# Patient Record
Sex: FEMALE | Race: WHITE | NOT HISPANIC OR LATINO | URBAN - METROPOLITAN AREA
[De-identification: names, ages, dates, MRNs, and addresses within clinical notes are randomized per-mention and may not be internally consistent; named-entity substitution may affect disease eponyms.]

---

## 2023-04-06 ENCOUNTER — EMERGENCY (EMERGENCY)
Facility: HOSPITAL | Age: 78
LOS: 0 days | Discharge: HOME | End: 2023-04-06
Attending: STUDENT IN AN ORGANIZED HEALTH CARE EDUCATION/TRAINING PROGRAM
Payer: MEDICARE

## 2023-04-06 VITALS
RESPIRATION RATE: 14 BRPM | HEART RATE: 55 BPM | WEIGHT: 169.98 LBS | DIASTOLIC BLOOD PRESSURE: 61 MMHG | OXYGEN SATURATION: 98 % | TEMPERATURE: 97 F | SYSTOLIC BLOOD PRESSURE: 129 MMHG

## 2023-04-06 DIAGNOSIS — Z20.822 CONTACT WITH AND (SUSPECTED) EXPOSURE TO COVID-19: ICD-10-CM
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
## 2023-04-06 DIAGNOSIS — T31.0 BURNS INVOLVING LESS THAN 10% OF BODY SURFACE: ICD-10-CM

## 2023-04-06 DIAGNOSIS — T24.211A BURN OF SECOND DEGREE OF RIGHT THIGH, INITIAL ENCOUNTER: ICD-10-CM
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
## 2023-04-06 DIAGNOSIS — T21.22XA BURN OF SECOND DEGREE OF ABDOMINAL WALL, INITIAL ENCOUNTER: ICD-10-CM
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
## 2023-04-06 LAB — SARS-COV-2 RNA SPEC QL NAA+PROBE: SIGNIFICANT CHANGE UP

## 2023-04-06 PROCEDURE — U0005: CPT

## 2023-04-06 PROCEDURE — 99285 EMERGENCY DEPT VISIT HI MDM: CPT | Mod: 25

## 2023-04-06 PROCEDURE — U0003: CPT

## 2023-04-06 PROCEDURE — 99284 EMERGENCY DEPT VISIT MOD MDM: CPT | Mod: GC

## 2023-04-06 PROCEDURE — 16020 DRESS/DEBRID P-THICK BURN S: CPT

## 2023-04-06 RX ORDER — CEPHALEXIN 500 MG
1 CAPSULE ORAL
Qty: 28 | Refills: 0
Start: 2023-04-06 | End: 2023-04-12

## 2023-04-06 RX ORDER — CEPHALEXIN 500 MG
1 CAPSULE ORAL
Qty: 40 | Refills: 0
Start: 2023-04-06 | End: 2023-04-15

## 2023-04-06 RX ORDER — AMPICILLIN SODIUM AND SULBACTAM SODIUM 250; 125 MG/ML; MG/ML
3 INJECTION, POWDER, FOR SUSPENSION INTRAMUSCULAR; INTRAVENOUS ONCE
Refills: 0 | Status: COMPLETED | OUTPATIENT
Start: 2023-04-06 | End: 2023-04-06

## 2023-04-06 RX ORDER — SODIUM CHLORIDE 9 MG/ML
1000 INJECTION, SOLUTION INTRAVENOUS ONCE
Refills: 0 | Status: COMPLETED | OUTPATIENT
Start: 2023-04-06 | End: 2023-04-06

## 2023-04-06 NOTE — ED PROVIDER NOTE - PATIENT PORTAL LINK FT
You can access the FollowMyHealth Patient Portal offered by [facility] by registering at the following website: [email or web address]. By joining [employer]’s FollowMyHealth portal, you will also be able to view your health information using other applications (apps) compatible with our system.

## 2023-04-06 NOTE — ED PROVIDER NOTE - NSFOLLOWUPCLINICS_GEN_ALL_ED_FT
[facility] Lower Level  Burn  [address] [place] [address]  Phone: [phone]  Fax:   Scheduled Appointment: 4/11/2023

## 2023-04-06 NOTE — ED ADULT TRIAGE NOTE - CHIEF COMPLAINT QUOTE
pt sent in from [facility] [place] for a burn on lower abdomen and thighs. pt burned from hot soup on friday. hx of diabetic

## 2023-04-06 NOTE — CONSULT NOTE ADULT - SUBJECTIVE AND OBJECTIVE BOX
CC: Burn by hot soup    HPI: 79 y/o F with PMH CAD s/p PCI, HTN, HLD presented to ED with daughter at bedside for burn to lower abdomen and right thigh on 3/31/23. Pt states she was making cup noodle soup, had just poured boiling water into cup when cup turned over onto her lap. Since injury had been applying lotion to affected areas. Was seen at [facility] yesterday and prescribed Keflex PO and Silvadene; was instructed to follow up with a burn specialist. Pt denies fever, pus from burned areas.       ALLERGIES:  No Known Allergies      VITALS:  T(C): 36.3 (06 Apr 2023 14:38), Max: 36.3 (06 Apr 2023 14:38)  T(F): 97.3 (06 Apr 2023 14:38), Max: 97.3 (06 Apr 2023 14:38)  HR: 55 (06 Apr 2023 14:38) (55 - 55)  BP: 129/61 (06 Apr 2023 14:38) (129/61 - 129/61)  RR: 14 (06 Apr 2023 14:38) (14 - 14)  SpO2: 98% (06 Apr 2023 14:38) (98% - 98%)  O2 Parameters below as of 06 Apr 2023 14:38  Patient On (Oxygen Delivery Method): room air      PHYSICAL EXAM:  GENERAL: NAD, well-developed  HEAD:  Atraumatic, Normocephalic  EYES: EOMI  CHEST/LUNG: Breathing normally on room air.  ABDOMEN: Soft, Nontender, Nondistended  PSYCH: AAOx3  SKIN: Suprapubic abdomen with 2nd degree burn with central area of adherent epidermis, lifting at periphery, with surrounding erythema c/w superficial burn; no purulent drainage or warmth. Right proximal medial thigh with 2nd degree burn, no purulent drainage or warmth; thigh burn debrided. TBSA ~1%

## 2023-04-06 NOTE — ED PROVIDER NOTE - NSFOLLOWUPINSTRUCTIONS_ED_ALL_ED_FT
declines
Burn    A burn is an injury to your skin or the tissues under your skin usually caused by heat or caustic chemicals. In severe cases, a burn can damage the muscles and bones under the skin. There are three different degrees of burns: first (mild), second, and third (severe). Make sure to use any prescribed ointments as directed. If you were prescribed antibiotic medicine, take it as told by your health care provider. Do not stop using the antibiotic even if your condition improves. Follow up is available at the burn clinic.    SEEK IMMEDIATE MEDICAL CARE IF YOU HAVE ANY OF THE FOLLOWING SYMPTOMS: red streaks near the burn, severe pain, or fever.

## 2023-04-06 NOTE — ED PROVIDER NOTE - OBJECTIVE STATEMENT
78 y f, pmh of dm, pw burn, started on friday when she spilled hot soup on herself, on abx, sent in for eval. Denies f/c, cp, sob, n/v/d, abd pain dysuria . Denies pain/injury elsewhere.

## 2023-04-06 NOTE — CONSULT NOTE ADULT - ASSESSMENT
77 y/o F with burn by hot soup to lower abdomen and right thigh.    PLAN:  - S/p debridement of right thigh burn. Silvadene and adaptic applied to lower abdomen and thigh.  - Instructed on twice daily cleaning and dressing. Supplies provided.   - Send Silvadene to pharmacy  - Continue Keflex  - Follow up in burn clinic Tuesday 4/11

## 2023-04-06 NOTE — ED PROVIDER NOTE - CLINICAL SUMMARY MEDICAL DECISION MAKING FREE TEXT BOX
78-year-old female with history of diabetes, hypertension, hyperlipidemia, MI status post PCI, A-fib on Xarelto, CHF presenting today with second-degree burn to the mons pubis as well as the right inner thigh.  Sustained burn with hot soup on Friday, was prescribed Keflex by urgent care yesterday as well as Silvadene.  Presents today for burn evaluation.     On exam well-appearing in no acute distress, vital stable.  Noted to have approximately 5 cm area of second-degree burn to the mons pubis, approximately 3 cm area to the right inner thigh, debrided by burn bedside.  Minimal surrounding erythema.  Burn evaluated patient and cleared for outpatient follow-up, will resend Keflex prescription, every 6 hours as well as Bactrim as patient is high risk.  Return precautions ronak in detail with the patient as well as her daughter.  Follow-up with burn clinic on Tuesday.

## 2023-04-06 NOTE — ED PROVIDER NOTE - PHYSICAL EXAMINATION
CONSTITUTIONAL: NAD  SKIN: +superficial 2nd degree burn to lower abdomen and right leg, 7%   HEAD: NCAT  EYES: NL inspection  ENT: MMM  NECK: Supple; non tender.  CARD: RRR  RESP: CTAB  ABD: S/NT no R/G  EXT: no pedal edema  NEURO: Grossly unremarkable  PSYCH: Cooperative, appropriate.

## 2023-04-06 NOTE — ED ADULT NURSE NOTE - OBJECTIVE STATEMENT
77 y/o female sent in from [facility] [place] for a burn on lower abdomen and thighs. pt burned from hot soup on friday. hx of diabetic

## 2023-04-06 NOTE — ED ADULT NURSE NOTE - CHIEF COMPLAINT QUOTE
pt sent in from [facility] for a burn on lower abdomen and thighs. pt burned from hot soup on friday. hx of diabetic

## 2023-04-10 PROBLEM — Z00.00 ENCOUNTER FOR PREVENTIVE HEALTH EXAMINATION: Status: ACTIVE | Noted: 2023-04-10

## 2023-04-11 ENCOUNTER — APPOINTMENT (OUTPATIENT)
Dept: BURN CARE | Facility: CLINIC | Age: 78
End: 2023-04-11
Payer: MEDICARE

## 2023-04-11 VITALS
HEIGHT: 64 IN | SYSTOLIC BLOOD PRESSURE: 90 MMHG | WEIGHT: 171 LBS | TEMPERATURE: 97.4 F | HEART RATE: 62 BPM | RESPIRATION RATE: 16 BRPM | BODY MASS INDEX: 29.19 KG/M2 | DIASTOLIC BLOOD PRESSURE: 60 MMHG

## 2023-04-11 PROCEDURE — 99212 OFFICE O/P EST SF 10 MIN: CPT

## 2023-04-16 NOTE — REASON FOR VISIT
[Initial] : initial visit [Were you seen in the Emergency Room?] : seen in the emergency room [Were you admitted to the burn center at [facility]?] : not admitted to the burn center at [facility]

## 2023-04-16 NOTE — HISTORY OF PRESENT ILLNESS
[Did you have an operation on your burn/wound injury?] : Did you have an operation on your burn/wound injury? No [Did this injury occur on the job?] : Did this injury occur on the job? No [de-identified] : 3/31/23 [de-identified] : home [de-identified] : 2nd degree burn left thigh and abdomen [de-identified] : healing

## 2023-04-16 NOTE — ASSESSMENT
[FreeTextEntry1] : The 3% TBSA 2nd degree burn left thigh and abdomen are healing.  The burned skin is pink and moist. The patient was instructed to clean  the wound with soap and water. Continue local wound care with moisturizer and silvadene cream   Follow up 1 week.  [Wound Care] : wound care

## 2023-04-16 NOTE — PHYSICAL EXAM
[Healing] : healing [Size%: ______] : Size: [unfilled]% [Infected?] : Infected: No [3] : 3 out of 10 [Abnormal] : abnormal [Large] : medium [] : no [de-identified] : The 3% TBSA 2nd degree burn left thigh and abdomen are healing.  The burned skin is pink and moist. The patient was instructed to clean  the wound with soap and water. Continue local wound care with moisturizer and silvadene cream   Follow up 1 week.  [TWNoteComboBox1] : adaptic [TWNoteComboBox2] : SSD

## 2023-04-25 ENCOUNTER — APPOINTMENT (OUTPATIENT)
Dept: BURN CARE | Facility: CLINIC | Age: 78
End: 2023-04-25
Payer: MEDICARE

## 2023-04-25 VITALS
DIASTOLIC BLOOD PRESSURE: 80 MMHG | WEIGHT: 172 LBS | HEIGHT: 64 IN | TEMPERATURE: 97 F | BODY MASS INDEX: 29.37 KG/M2 | HEART RATE: 86 BPM | RESPIRATION RATE: 18 BRPM | SYSTOLIC BLOOD PRESSURE: 110 MMHG

## 2023-04-25 DIAGNOSIS — T30.0 BURN OF UNSPECIFIED BODY REGION, UNSPECIFIED DEGREE: ICD-10-CM

## 2023-04-25 PROCEDURE — 99213 OFFICE O/P EST LOW 20 MIN: CPT

## 2023-04-25 RX ORDER — SILVER SULFADIAZINE 10 MG/G
1 CREAM TOPICAL TWICE DAILY
Qty: 1 | Refills: 1 | Status: ACTIVE | COMMUNITY
Start: 2023-04-25 | End: 1900-01-01

## 2023-04-26 NOTE — HISTORY OF PRESENT ILLNESS
[Did you have an operation on your burn/wound injury?] : Did you have an operation on your burn/wound injury? No [Did this injury occur on the job?] : Did this injury occur on the job? No [de-identified] : 3/31/23 [de-identified] : home [de-identified] : 2nd degree burn left thigh and abdomen [de-identified] : healing

## 2023-04-26 NOTE — REASON FOR VISIT
[Revisit] : revisit [Were you seen in the Emergency Room?] : seen in the emergency room [Were you admitted to the burn center at [facility]?] : not admitted to the burn center at [facility]

## 2023-04-26 NOTE — PHYSICAL EXAM
[Healing] : healing [Size%: ______] : Size: [unfilled]% [Infected?] : Infected: No [3] : 3 out of 10 [Abnormal] : abnormal [Large] : medium [] : yes [de-identified] : The 3% TBSA 2nd degree burn left thigh and abdomen are healing.  The burned skin is pink and moist. The patient was instructed to clean  the wound with soap and water. Continue local wound care with moisturizer and silvadene cream   Follow up 1 week.  [TWNoteComboBox1] : adaptic [TWNoteComboBox2] : SSD

## 2023-04-27 NOTE — ED ADULT NURSE NOTE - CAS TRG GEN SKIN CONDITION
Warm Island Pedicle Flap-Requiring Vessel Identification Text: The defect edges were debeveled with a #15 scalpel blade. Given the location of the defect, shape of the defect and the proximity to free margins an island pedicle advancement flap was deemed most appropriate. Using a sterile surgical marker, an appropriate advancement flap was drawn, based on the axial vessel mentioned above, incorporating the defect, outlining the appropriate donor tissue and placing the expected incisions within the relaxed skin tension lines where possible. The area thus outlined was incised deep to adipose tissue with a #15 scalpel blade. The skin margins were undermined to an appropriate distance in all directions around the primary defect and laterally outward around the island pedicle utilizing iris scissors.  There was minimal undermining beneath the pedicle flap. Following this, the designed flap was carried over into the primary defect and sutured into place.

## 2023-05-09 ENCOUNTER — APPOINTMENT (OUTPATIENT)
Dept: BURN CARE | Facility: CLINIC | Age: 78
End: 2023-05-09
Payer: MEDICARE

## 2023-05-09 VITALS
SYSTOLIC BLOOD PRESSURE: 113 MMHG | BODY MASS INDEX: 30.65 KG/M2 | DIASTOLIC BLOOD PRESSURE: 63 MMHG | HEART RATE: 57 BPM | HEIGHT: 63 IN | WEIGHT: 173 LBS

## 2023-05-09 PROCEDURE — 99212 OFFICE O/P EST SF 10 MIN: CPT

## 2023-05-09 NOTE — ASSESSMENT
[FreeTextEntry1] : The 3% TBSA 2nd degree burn left thigh and 3rd degree burn  abdomen are healing.  The burned skin is pink and moist. The patient was instructed to clean  the wound with soap and water. Continue local wound care with moisturizer and silvadene cream   Follow up 2 week.  [Wound Care] : wound care

## 2023-05-09 NOTE — REASON FOR VISIT
[Revisit] : revisit [Were you seen in the Emergency Room?] : seen in the emergency room [Were you admitted to the burn center at [facility]?] : not admitted to the burn center at [facility]

## 2023-05-09 NOTE — HISTORY OF PRESENT ILLNESS
[Did you have an operation on your burn/wound injury?] : Did you have an operation on your burn/wound injury? No [Did this injury occur on the job?] : Did this injury occur on the job? No [de-identified] : 3/31/23 [de-identified] : home [de-identified] : 2nd degree burn and 3rd degree burn  left thigh and abdomen [de-identified] : healing

## 2023-05-09 NOTE — PHYSICAL EXAM
[Healing] : healing [Size%: ______] : Size: [unfilled]% [Infected?] : Infected: No [3] : 3 out of 10 [Abnormal] : abnormal [Large] : medium [] : yes [de-identified] : The 3% TBSA 2nd degree burn left thigh and 3rd degree burn  abdomen are healing.  The burned skin is pink and moist. The patient was instructed to clean  the wound with soap and water. Continue local wound care with moisturizer and silvadene cream   Follow up 2 week.  [TWNoteComboBox1] : adaptic [TWNoteComboBox2] : SSD

## 2023-05-23 ENCOUNTER — APPOINTMENT (OUTPATIENT)
Dept: BURN CARE | Facility: CLINIC | Age: 78
End: 2023-05-23
Payer: MEDICARE

## 2023-05-23 PROCEDURE — 99212 OFFICE O/P EST SF 10 MIN: CPT

## 2023-05-29 NOTE — ASSESSMENT
[FreeTextEntry1] : The 3% TBSA 2nd degree burn left thigh and 3rd degree burn  abdomen are healed.  The burned skin is pink and dry. The patient was instructed to clean  the wound with soap and water. Continue local wound care with moisturizer and sunblock.    Follow up prn. [Wound Care] : wound care

## 2023-05-29 NOTE — PHYSICAL EXAM
[Closed] : closed [Size%: ______] : Size: [unfilled]% [Infected?] : Infected: No [3] : 3 out of 10 [Abnormal] : abnormal [Large] : medium [] : no [de-identified] : The 3% TBSA 2nd degree burn left thigh and 3rd degree burn  abdomen are healed.  The burned skin is pink and dry. The patient was instructed to clean  the wound with soap and water. Continue local wound care with moisturizer and sunblock.    Follow up prn. [TWNoteComboBox1] : adaptic [TWNoteComboBox2] : False

## 2023-05-29 NOTE — HISTORY OF PRESENT ILLNESS
[Did you have an operation on your burn/wound injury?] : Did you have an operation on your burn/wound injury? No [Did this injury occur on the job?] : Did this injury occur on the job? No [de-identified] : 3/31/23 [de-identified] : home [de-identified] : 2nd degree burn and 3rd degree burn  left thigh and abdomen [de-identified] : healed

## 2023-05-29 NOTE — REASON FOR VISIT
[Revisit] : revisit [Were you seen in the Emergency Room?] : seen in the emergency room [Were you admitted to the burn center at [facility]?] : not admitted to the burn center at [facility]

## 2023-10-30 ENCOUNTER — EMERGENCY (EMERGENCY)
Facility: HOSPITAL | Age: 78
LOS: 1 days | Discharge: HOME | End: 2023-10-30
Attending: EMERGENCY MEDICINE
Payer: MEDICARE

## 2023-10-30 VITALS
TEMPERATURE: 98 F | OXYGEN SATURATION: 100 % | HEART RATE: 54 BPM | DIASTOLIC BLOOD PRESSURE: 73 MMHG | RESPIRATION RATE: 18 BRPM | SYSTOLIC BLOOD PRESSURE: 122 MMHG | WEIGHT: 177.91 LBS

## 2023-10-30 VITALS
DIASTOLIC BLOOD PRESSURE: 62 MMHG | OXYGEN SATURATION: 98 % | SYSTOLIC BLOOD PRESSURE: 138 MMHG | TEMPERATURE: 98 F | HEART RATE: 55 BPM | RESPIRATION RATE: 18 BRPM

## 2023-10-30 LAB
ALBUMIN SERPL ELPH-MCNC: 3.8 G/DL — SIGNIFICANT CHANGE UP (ref 3.3–5)
ALBUMIN SERPL ELPH-MCNC: 3.8 G/DL — SIGNIFICANT CHANGE UP (ref 3.3–5)
ALP SERPL-CCNC: 131 U/L — HIGH (ref 40–120)
ALP SERPL-CCNC: 131 U/L — HIGH (ref 40–120)
ALT FLD-CCNC: 25 U/L — SIGNIFICANT CHANGE UP (ref 10–45)
ALT FLD-CCNC: 25 U/L — SIGNIFICANT CHANGE UP (ref 10–45)
ANION GAP SERPL CALC-SCNC: 11 MMOL/L — SIGNIFICANT CHANGE UP (ref 5–17)
ANION GAP SERPL CALC-SCNC: 11 MMOL/L — SIGNIFICANT CHANGE UP (ref 5–17)
AST SERPL-CCNC: 25 U/L — SIGNIFICANT CHANGE UP (ref 10–40)
AST SERPL-CCNC: 25 U/L — SIGNIFICANT CHANGE UP (ref 10–40)
BASE EXCESS BLDV CALC-SCNC: -2.1 MMOL/L — LOW (ref -2–3)
BASE EXCESS BLDV CALC-SCNC: -2.1 MMOL/L — LOW (ref -2–3)
BASOPHILS # BLD AUTO: 0.05 K/UL — SIGNIFICANT CHANGE UP (ref 0–0.2)
BASOPHILS # BLD AUTO: 0.05 K/UL — SIGNIFICANT CHANGE UP (ref 0–0.2)
BASOPHILS NFR BLD AUTO: 0.9 % — SIGNIFICANT CHANGE UP (ref 0–2)
BASOPHILS NFR BLD AUTO: 0.9 % — SIGNIFICANT CHANGE UP (ref 0–2)
BILIRUB SERPL-MCNC: 0.4 MG/DL — SIGNIFICANT CHANGE UP (ref 0.2–1.2)
BILIRUB SERPL-MCNC: 0.4 MG/DL — SIGNIFICANT CHANGE UP (ref 0.2–1.2)
BUN SERPL-MCNC: 34 MG/DL — HIGH (ref 7–23)
BUN SERPL-MCNC: 34 MG/DL — HIGH (ref 7–23)
CA-I SERPL-SCNC: 1.22 MMOL/L — SIGNIFICANT CHANGE UP (ref 1.15–1.33)
CA-I SERPL-SCNC: 1.22 MMOL/L — SIGNIFICANT CHANGE UP (ref 1.15–1.33)
CALCIUM SERPL-MCNC: 8.6 MG/DL — SIGNIFICANT CHANGE UP (ref 8.4–10.5)
CALCIUM SERPL-MCNC: 8.6 MG/DL — SIGNIFICANT CHANGE UP (ref 8.4–10.5)
CHLORIDE BLDV-SCNC: 106 MMOL/L — SIGNIFICANT CHANGE UP (ref 96–108)
CHLORIDE BLDV-SCNC: 106 MMOL/L — SIGNIFICANT CHANGE UP (ref 96–108)
CHLORIDE SERPL-SCNC: 106 MMOL/L — SIGNIFICANT CHANGE UP (ref 96–108)
CHLORIDE SERPL-SCNC: 106 MMOL/L — SIGNIFICANT CHANGE UP (ref 96–108)
CO2 BLDV-SCNC: 26 MMOL/L — SIGNIFICANT CHANGE UP (ref 22–26)
CO2 BLDV-SCNC: 26 MMOL/L — SIGNIFICANT CHANGE UP (ref 22–26)
CO2 SERPL-SCNC: 23 MMOL/L — SIGNIFICANT CHANGE UP (ref 22–31)
CO2 SERPL-SCNC: 23 MMOL/L — SIGNIFICANT CHANGE UP (ref 22–31)
CREAT SERPL-MCNC: 1.53 MG/DL — HIGH (ref 0.5–1.3)
CREAT SERPL-MCNC: 1.53 MG/DL — HIGH (ref 0.5–1.3)
EGFR: 34 ML/MIN/1.73M2 — LOW
EGFR: 34 ML/MIN/1.73M2 — LOW
EOSINOPHIL # BLD AUTO: 0.34 K/UL — SIGNIFICANT CHANGE UP (ref 0–0.5)
EOSINOPHIL # BLD AUTO: 0.34 K/UL — SIGNIFICANT CHANGE UP (ref 0–0.5)
EOSINOPHIL NFR BLD AUTO: 5.8 % — SIGNIFICANT CHANGE UP (ref 0–6)
EOSINOPHIL NFR BLD AUTO: 5.8 % — SIGNIFICANT CHANGE UP (ref 0–6)
GAS PNL BLDV: 136 MMOL/L — SIGNIFICANT CHANGE UP (ref 136–145)
GAS PNL BLDV: 136 MMOL/L — SIGNIFICANT CHANGE UP (ref 136–145)
GAS PNL BLDV: SIGNIFICANT CHANGE UP
GLUCOSE BLDV-MCNC: 103 MG/DL — HIGH (ref 70–99)
GLUCOSE BLDV-MCNC: 103 MG/DL — HIGH (ref 70–99)
GLUCOSE SERPL-MCNC: 111 MG/DL — HIGH (ref 70–99)
GLUCOSE SERPL-MCNC: 111 MG/DL — HIGH (ref 70–99)
HCO3 BLDV-SCNC: 25 MMOL/L — SIGNIFICANT CHANGE UP (ref 22–29)
HCO3 BLDV-SCNC: 25 MMOL/L — SIGNIFICANT CHANGE UP (ref 22–29)
HCT VFR BLD CALC: 32.1 % — LOW (ref 34.5–45)
HCT VFR BLD CALC: 32.1 % — LOW (ref 34.5–45)
HCT VFR BLDA CALC: 30 % — LOW (ref 34.5–46.5)
HCT VFR BLDA CALC: 30 % — LOW (ref 34.5–46.5)
HGB BLD CALC-MCNC: 10.1 G/DL — LOW (ref 11.7–16.1)
HGB BLD CALC-MCNC: 10.1 G/DL — LOW (ref 11.7–16.1)
HGB BLD-MCNC: 10 G/DL — LOW (ref 11.5–15.5)
HGB BLD-MCNC: 10 G/DL — LOW (ref 11.5–15.5)
IMM GRANULOCYTES NFR BLD AUTO: 0.2 % — SIGNIFICANT CHANGE UP (ref 0–0.9)
IMM GRANULOCYTES NFR BLD AUTO: 0.2 % — SIGNIFICANT CHANGE UP (ref 0–0.9)
LACTATE BLDV-MCNC: 1.9 MMOL/L — SIGNIFICANT CHANGE UP (ref 0.5–2)
LACTATE BLDV-MCNC: 1.9 MMOL/L — SIGNIFICANT CHANGE UP (ref 0.5–2)
LYMPHOCYTES # BLD AUTO: 1.62 K/UL — SIGNIFICANT CHANGE UP (ref 1–3.3)
LYMPHOCYTES # BLD AUTO: 1.62 K/UL — SIGNIFICANT CHANGE UP (ref 1–3.3)
LYMPHOCYTES # BLD AUTO: 27.6 % — SIGNIFICANT CHANGE UP (ref 13–44)
LYMPHOCYTES # BLD AUTO: 27.6 % — SIGNIFICANT CHANGE UP (ref 13–44)
MAGNESIUM SERPL-MCNC: 2.5 MG/DL — SIGNIFICANT CHANGE UP (ref 1.6–2.6)
MAGNESIUM SERPL-MCNC: 2.5 MG/DL — SIGNIFICANT CHANGE UP (ref 1.6–2.6)
MCHC RBC-ENTMCNC: 29.5 PG — SIGNIFICANT CHANGE UP (ref 27–34)
MCHC RBC-ENTMCNC: 29.5 PG — SIGNIFICANT CHANGE UP (ref 27–34)
MCHC RBC-ENTMCNC: 31.2 GM/DL — LOW (ref 32–36)
MCHC RBC-ENTMCNC: 31.2 GM/DL — LOW (ref 32–36)
MCV RBC AUTO: 94.7 FL — SIGNIFICANT CHANGE UP (ref 80–100)
MCV RBC AUTO: 94.7 FL — SIGNIFICANT CHANGE UP (ref 80–100)
MONOCYTES # BLD AUTO: 0.73 K/UL — SIGNIFICANT CHANGE UP (ref 0–0.9)
MONOCYTES # BLD AUTO: 0.73 K/UL — SIGNIFICANT CHANGE UP (ref 0–0.9)
MONOCYTES NFR BLD AUTO: 12.4 % — SIGNIFICANT CHANGE UP (ref 2–14)
MONOCYTES NFR BLD AUTO: 12.4 % — SIGNIFICANT CHANGE UP (ref 2–14)
NEUTROPHILS # BLD AUTO: 3.12 K/UL — SIGNIFICANT CHANGE UP (ref 1.8–7.4)
NEUTROPHILS # BLD AUTO: 3.12 K/UL — SIGNIFICANT CHANGE UP (ref 1.8–7.4)
NEUTROPHILS NFR BLD AUTO: 53.1 % — SIGNIFICANT CHANGE UP (ref 43–77)
NEUTROPHILS NFR BLD AUTO: 53.1 % — SIGNIFICANT CHANGE UP (ref 43–77)
NRBC # BLD: 0 /100 WBCS — SIGNIFICANT CHANGE UP (ref 0–0)
NRBC # BLD: 0 /100 WBCS — SIGNIFICANT CHANGE UP (ref 0–0)
PCO2 BLDV: 50 MMHG — HIGH (ref 39–42)
PCO2 BLDV: 50 MMHG — HIGH (ref 39–42)
PH BLDV: 7.3 — LOW (ref 7.32–7.43)
PH BLDV: 7.3 — LOW (ref 7.32–7.43)
PLATELET # BLD AUTO: 175 K/UL — SIGNIFICANT CHANGE UP (ref 150–400)
PLATELET # BLD AUTO: 175 K/UL — SIGNIFICANT CHANGE UP (ref 150–400)
PO2 BLDV: 30 MMHG — SIGNIFICANT CHANGE UP (ref 25–45)
PO2 BLDV: 30 MMHG — SIGNIFICANT CHANGE UP (ref 25–45)
POTASSIUM BLDV-SCNC: 5.4 MMOL/L — HIGH (ref 3.5–5.1)
POTASSIUM BLDV-SCNC: 5.4 MMOL/L — HIGH (ref 3.5–5.1)
POTASSIUM SERPL-MCNC: 5.6 MMOL/L — HIGH (ref 3.5–5.3)
POTASSIUM SERPL-MCNC: 5.6 MMOL/L — HIGH (ref 3.5–5.3)
POTASSIUM SERPL-SCNC: 5.6 MMOL/L — HIGH (ref 3.5–5.3)
POTASSIUM SERPL-SCNC: 5.6 MMOL/L — HIGH (ref 3.5–5.3)
PROT SERPL-MCNC: 6.9 G/DL — SIGNIFICANT CHANGE UP (ref 6–8.3)
PROT SERPL-MCNC: 6.9 G/DL — SIGNIFICANT CHANGE UP (ref 6–8.3)
RBC # BLD: 3.39 M/UL — LOW (ref 3.8–5.2)
RBC # BLD: 3.39 M/UL — LOW (ref 3.8–5.2)
RBC # FLD: 14.5 % — SIGNIFICANT CHANGE UP (ref 10.3–14.5)
RBC # FLD: 14.5 % — SIGNIFICANT CHANGE UP (ref 10.3–14.5)
SAO2 % BLDV: 42.6 % — LOW (ref 67–88)
SAO2 % BLDV: 42.6 % — LOW (ref 67–88)
SODIUM SERPL-SCNC: 140 MMOL/L — SIGNIFICANT CHANGE UP (ref 135–145)
SODIUM SERPL-SCNC: 140 MMOL/L — SIGNIFICANT CHANGE UP (ref 135–145)
TROPONIN T, HIGH SENSITIVITY RESULT: 23 NG/L — SIGNIFICANT CHANGE UP (ref 0–51)
TROPONIN T, HIGH SENSITIVITY RESULT: 23 NG/L — SIGNIFICANT CHANGE UP (ref 0–51)
TROPONIN T, HIGH SENSITIVITY RESULT: 24 NG/L — SIGNIFICANT CHANGE UP (ref 0–51)
TROPONIN T, HIGH SENSITIVITY RESULT: 24 NG/L — SIGNIFICANT CHANGE UP (ref 0–51)
WBC # BLD: 5.87 K/UL — SIGNIFICANT CHANGE UP (ref 3.8–10.5)
WBC # BLD: 5.87 K/UL — SIGNIFICANT CHANGE UP (ref 3.8–10.5)
WBC # FLD AUTO: 5.87 K/UL — SIGNIFICANT CHANGE UP (ref 3.8–10.5)
WBC # FLD AUTO: 5.87 K/UL — SIGNIFICANT CHANGE UP (ref 3.8–10.5)

## 2023-10-30 PROCEDURE — 71045 X-RAY EXAM CHEST 1 VIEW: CPT | Mod: 26

## 2023-10-30 PROCEDURE — 99284 EMERGENCY DEPT VISIT MOD MDM: CPT

## 2023-10-30 PROCEDURE — 83605 ASSAY OF LACTIC ACID: CPT

## 2023-10-30 PROCEDURE — 82803 BLOOD GASES ANY COMBINATION: CPT

## 2023-10-30 PROCEDURE — 82550 ASSAY OF CK (CPK): CPT

## 2023-10-30 PROCEDURE — 80053 COMPREHEN METABOLIC PANEL: CPT

## 2023-10-30 PROCEDURE — 82947 ASSAY GLUCOSE BLOOD QUANT: CPT

## 2023-10-30 PROCEDURE — 85018 HEMOGLOBIN: CPT

## 2023-10-30 PROCEDURE — 82330 ASSAY OF CALCIUM: CPT

## 2023-10-30 PROCEDURE — 36415 COLL VENOUS BLD VENIPUNCTURE: CPT

## 2023-10-30 PROCEDURE — 99285 EMERGENCY DEPT VISIT HI MDM: CPT | Mod: 25

## 2023-10-30 PROCEDURE — 83735 ASSAY OF MAGNESIUM: CPT

## 2023-10-30 PROCEDURE — 84484 ASSAY OF TROPONIN QUANT: CPT

## 2023-10-30 PROCEDURE — 71045 X-RAY EXAM CHEST 1 VIEW: CPT

## 2023-10-30 PROCEDURE — 93005 ELECTROCARDIOGRAM TRACING: CPT

## 2023-10-30 PROCEDURE — 85025 COMPLETE CBC W/AUTO DIFF WBC: CPT

## 2023-10-30 PROCEDURE — 84132 ASSAY OF SERUM POTASSIUM: CPT

## 2023-10-30 PROCEDURE — 85014 HEMATOCRIT: CPT

## 2023-10-30 PROCEDURE — 84295 ASSAY OF SERUM SODIUM: CPT

## 2023-10-30 PROCEDURE — 82435 ASSAY OF BLOOD CHLORIDE: CPT

## 2023-10-30 RX ORDER — ACETAMINOPHEN 500 MG
1000 TABLET ORAL ONCE
Refills: 0 | Status: COMPLETED | OUTPATIENT
Start: 2023-10-30 | End: 2023-10-30

## 2023-10-30 RX ADMIN — Medication 400 MILLIGRAM(S): at 12:25

## 2023-10-30 RX ADMIN — Medication 1000 MILLIGRAM(S): at 12:40

## 2023-10-30 RX ADMIN — Medication 1000 MILLIGRAM(S): at 13:00

## 2023-10-30 NOTE — ED ADULT NURSE NOTE - NSFALLHARMRISKINTERV_ED_ALL_ED
Assistance OOB with selected safe patient handling equipment if applicable/Assistance with ambulation/Communicate risk of Fall with Harm to all staff, patient, and family/Monitor gait and stability/Provide visual cue: red socks, yellow wristband, yellow gown, etc/Reinforce activity limits and safety measures with patient and family/Bed in lowest position, wheels locked, appropriate side rails in place/Call bell, personal items and telephone in reach/Instruct patient to call for assistance before getting out of bed/chair/stretcher/Non-slip footwear applied when patient is off stretcher/Grace to call system/Physically safe environment - no spills, clutter or unnecessary equipment/Purposeful Proactive Rounding/Room/bathroom lighting operational, light cord in reach

## 2023-10-30 NOTE — ED PROVIDER NOTE - NSFOLLOWUPINSTRUCTIONS_ED_ALL_ED_FT
-- Please follow-up with your doctor(s) within the next week, but see medical sooner if your symptoms persist or worsen.  Please call tomorrow for an appointment.  If you cannot follow-up with your primary doctor please return to the ED for any urgent issues.  -- You were given a copy of your labs and imaging.  Please go-over these with your doctor(s).   -- If you have any worsening of symptoms or any other concerns please see your doctor or return to the ED immediately.    Chest Pain    Chest pain can be caused by many different conditions which may or may not be dangerous. Causes include heartburn, lung infections, heart attack, blood clot in lungs, skin infections, strain or damage to muscle, cartilage, or bones, etc. In addition to a history and physical examination, an electrocardiogram (ECG) or other lab tests may have been performed to determine the cause of your chest pain. Follow up with your primary care provider or with a cardiologist as instructed.     SEEK IMMEDIATE MEDICAL CARE IF YOU HAVE ANY OF THE FOLLOWING SYMPTOMS: worsening chest pain, coughing up blood, unexplained back/neck/jaw pain, severe abdominal pain, dizziness or lightheadedness, fainting, shortness of breath, sweaty or clammy skin, vomiting, or racing heart beat. These symptoms may represent a serious problem that is an emergency. Do not wait to see if the symptoms will go away. Get medical help right away. Call 911 and do not drive yourself to the hospital.

## 2023-10-30 NOTE — ED PROVIDER NOTE - MDM ORDERS SUBMITTED SELECTION
Pt feng. Stated she was leaving immediately to get her kids, left before signing AMA paper. IV removed.       Erika Reid RN  08/03/23 0078
Labs/EKG/Imaging Studies/Medications

## 2023-10-30 NOTE — ED PROVIDER NOTE - CLINICAL SUMMARY MEDICAL DECISION MAKING FREE TEXT BOX
Jaron Busby MD, PGY2  78-year-old female with past medical history of diabetes, CAD with 2 stents, atrial fibrillation on Xarelto presenting to emergency department with right-sided chest pain intermittent in nature for the past week.  Patient states pain is worse with movement.  This morning began feeling pain on left side of chest as well so presented to emergency department for evaluation.  Also states that during episode of chest pain feels mild lightheadedness that improves when sitting down.  Patient has not been taking any medication for symptom.  Follows with cardiologist Dr. Bustos at [facility], but states she needs a new cardiologist because he is too far away.  Pain is not associated with exertion.  Comes and goes at random times.  Denies fever, headache, cough, shortness of breath, abdominal pain, nausea, vomiting, diarrhea, extremity edema.    In the emergency department patient is hemodynamically stable, afebrile.  EKG showing sinus bradycardia with first-degree AV block, patient on metoprolol.  As per patient heart rate always in the 50s at baseline.  On exam patient has normal heart sounds, clear lungs, abdomen soft nontender nondistended.  Differential including but not limited to ACS, unstable/stable angina, MSK related pain.  Plan to obtain labs, chest x-ray, put on cardiac monitor, will give Tylenol to see if it improves symptoms.  Will reassess.

## 2023-10-30 NOTE — ED PROVIDER NOTE - PATIENT PORTAL LINK FT
You can access the FollowMyHealth Patient Portal offered by [facility] by registering at the following website: [email or web address]. By joining [employer]’s FollowMyHealth portal, you will also be able to view your health information using other applications (apps) compatible with our system.

## 2023-10-30 NOTE — ED PROVIDER NOTE - PROGRESS NOTE DETAILS
Jaron Busby MD, PGY2  Labs and imaging non actionable. likely msk related pain. Alvaro Jurado discuss cardiology follow up with patient in ED. Pt will follow up with Dr. Duenas. Plan to dc home. Pt in agreement with plan. Answered all questions and gave return precautions.

## 2023-10-30 NOTE — ED PROVIDER NOTE - PHYSICAL EXAMINATION
Gen: WDWN, NAD  HEENT: EOMI, no nasal discharge, mucous membranes moist  CV: bradycardic, +S1/S2, no M/R/G, 2+ radial pulses b/l. No chest wall tenderness to palpation  Resp: CTAB, no W/R/R, no accessory muscle use, no increased work of breathing  GI: Abdomen soft non-distended, NTTP  MSK: No open wounds, no bruising, no LE edema  Neuro: A&Ox4, following commands, moving all four extremities spontaneously  Psych: appropriate mood

## 2023-10-30 NOTE — ED PROVIDER NOTE - ATTENDING CONTRIBUTION TO CARE
Attending MD Guallpa: I personally have seen and examined this patient.  Resident note reviewed and agree on plan of care and except where noted.  See below for details.     Seen in [facility]    78F with PMH/PSH including DM, CAD s/p stents, AFib on Xarelto presents to the ED with chest pain for a week.  Reports chest pain is R sided, intermittent, worse with movement, with associated dizziness.  Reports pain improves with sitting and resting.  Denies exertional, shortness of breath, LOC, headache.  Denies abdominal pain, nausea, vomiting, diarrhea, black or bloody BMs.  Denies recent illness, URI symptoms, fevers.  Denies trauma, falls, MVC.    Exam:   General: NAD  HENT: head NCAT, airway patent  Eyes: anicteric, no conjunctival injection   Lungs: lungs CTAB with good inspiratory effort, no wheezing, no rhonchi, no rales  Cardiac: +S1S2, no obvious m/r/g, no LE edema  GI: abdomen soft with +BS, NT, ND  : no CVAT  MSK: ranging neck and extremities freely  Neuro: moving all extremities spontaneously, nonfocal  Psych: normal mood and affect     A/P: 78F with chest pain, will evaluate for but not limited to unstable angina/ACS, MSK, will consider infectious such as PNA, will obtain labs including trop, CXR, EKG, will keep on monitor, will give Tylenol, reassess

## 2023-10-30 NOTE — ED ADULT NURSE NOTE - OBJECTIVE STATEMENT
[id number] 78 yr old WF brought to ER by daughter for further eval and tx of Right breast pain. s/p tripped and fell 4 wks ago landing on chest and face per daughter. No LOC. Pt is on Xarelto. A&Ox4. Denies dizziness or HA. Healing ecchymosis noted at right breast. Fall risk precautions maintained. Denies dizziness, palp, SOB, cough, congestion, fever or chills. Fall risk precautions maintained. Cardiac monitor intact

## 2023-11-07 ENCOUNTER — APPOINTMENT (OUTPATIENT)
Dept: CARDIOLOGY | Facility: CLINIC | Age: 78
End: 2023-11-07
Payer: MEDICARE

## 2023-11-07 VITALS
SYSTOLIC BLOOD PRESSURE: 117 MMHG | DIASTOLIC BLOOD PRESSURE: 78 MMHG | WEIGHT: 182 LBS | OXYGEN SATURATION: 97 % | TEMPERATURE: 98.2 F | HEIGHT: 63 IN | BODY MASS INDEX: 32.25 KG/M2 | HEART RATE: 64 BPM

## 2023-11-07 DIAGNOSIS — Z82.49 FAMILY HISTORY OF ISCHEMIC HEART DISEASE AND OTHER DISEASES OF THE CIRCULATORY SYSTEM: ICD-10-CM

## 2023-11-07 PROBLEM — I25.10 ATHEROSCLEROTIC HEART DISEASE OF NATIVE CORONARY ARTERY WITHOUT ANGINA PECTORIS: Chronic | Status: ACTIVE | Noted: 2023-10-30

## 2023-11-07 PROBLEM — E11.9 TYPE 2 DIABETES MELLITUS WITHOUT COMPLICATIONS: Chronic | Status: ACTIVE | Noted: 2023-10-30

## 2023-11-07 PROCEDURE — 99204 OFFICE O/P NEW MOD 45 MIN: CPT

## 2023-11-07 PROCEDURE — 93000 ELECTROCARDIOGRAM COMPLETE: CPT

## 2023-11-07 RX ORDER — DAPAGLIFLOZIN 10 MG/1
10 TABLET, FILM COATED ORAL
Qty: 90 | Refills: 3 | Status: ACTIVE | COMMUNITY

## 2023-11-07 RX ORDER — SPIRONOLACTONE 25 MG/1
25 TABLET ORAL DAILY
Refills: 0 | Status: ACTIVE | COMMUNITY

## 2023-11-07 RX ORDER — METOPROLOL SUCCINATE 25 MG/1
25 TABLET, EXTENDED RELEASE ORAL
Qty: 270 | Refills: 2 | Status: ACTIVE | COMMUNITY

## 2023-11-07 RX ORDER — ROSUVASTATIN CALCIUM 20 MG/1
20 TABLET, FILM COATED ORAL
Qty: 90 | Refills: 0 | Status: ACTIVE | COMMUNITY

## 2023-11-07 RX ORDER — GABAPENTIN 300 MG/1
300 CAPSULE ORAL DAILY
Refills: 0 | Status: ACTIVE | COMMUNITY

## 2023-11-07 RX ORDER — CHOLECALCIFEROL (VITAMIN D3) 25 MCG
TABLET ORAL
Refills: 0 | Status: ACTIVE | COMMUNITY

## 2023-11-07 RX ORDER — MONTELUKAST 10 MG/1
10 TABLET, FILM COATED ORAL
Refills: 0 | Status: ACTIVE | COMMUNITY

## 2023-11-07 RX ORDER — LEVOTHYROXINE SODIUM 50 UG/1
50 CAPSULE ORAL
Refills: 0 | Status: ACTIVE | COMMUNITY

## 2023-11-07 RX ORDER — TIZANIDINE HYDROCHLORIDE 4 MG/1
4 CAPSULE ORAL
Refills: 0 | Status: ACTIVE | COMMUNITY

## 2023-11-15 ENCOUNTER — APPOINTMENT (OUTPATIENT)
Dept: CARDIOLOGY | Facility: CLINIC | Age: 78
End: 2023-11-15
Payer: MEDICARE

## 2023-11-15 PROCEDURE — 78452 HT MUSCLE IMAGE SPECT MULT: CPT

## 2023-11-15 PROCEDURE — A9500: CPT

## 2023-11-15 PROCEDURE — 93306 TTE W/DOPPLER COMPLETE: CPT

## 2023-11-15 PROCEDURE — 93015 CV STRESS TEST SUPVJ I&R: CPT

## 2023-11-15 RX ORDER — REGADENOSON 0.08 MG/ML
0.4 INJECTION, SOLUTION INTRAVENOUS
Qty: 1 | Refills: 0 | Status: COMPLETED | OUTPATIENT
Start: 2023-11-15

## 2023-11-15 RX ADMIN — REGADENOSON 0 MG/5ML: 0.08 INJECTION, SOLUTION INTRAVENOUS at 00:00

## 2023-11-19 PROCEDURE — 93241 XTRNL ECG REC>48HR<7D: CPT

## 2023-11-20 ENCOUNTER — APPOINTMENT (OUTPATIENT)
Dept: CARDIOLOGY | Facility: CLINIC | Age: 78
End: 2023-11-20
Payer: MEDICARE

## 2023-11-20 VITALS
BODY MASS INDEX: 31.89 KG/M2 | HEIGHT: 63 IN | SYSTOLIC BLOOD PRESSURE: 90 MMHG | OXYGEN SATURATION: 96 % | HEART RATE: 61 BPM | WEIGHT: 180 LBS | DIASTOLIC BLOOD PRESSURE: 60 MMHG | TEMPERATURE: 97.7 F

## 2023-11-20 DIAGNOSIS — M79.601 PAIN IN RIGHT ARM: ICD-10-CM

## 2023-11-20 PROCEDURE — 93000 ELECTROCARDIOGRAM COMPLETE: CPT

## 2023-11-20 PROCEDURE — 99214 OFFICE O/P EST MOD 30 MIN: CPT

## 2023-11-20 RX ORDER — ASPIRIN 81 MG/1
81 TABLET, FILM COATED ORAL DAILY
Refills: 0 | Status: ACTIVE | COMMUNITY
Start: 2023-11-20

## 2023-11-21 ENCOUNTER — NON-APPOINTMENT (OUTPATIENT)
Age: 78
End: 2023-11-21

## 2023-11-21 LAB
25(OH)D3 SERPL-MCNC: 27 NG/ML
ALBUMIN SERPL ELPH-MCNC: 4.1 G/DL
ALBUMIN SERPL ELPH-MCNC: 4.2 G/DL
ALP BLD-CCNC: 134 U/L
ALP BLD-CCNC: 148 U/L
ALT SERPL-CCNC: 20 U/L
ALT SERPL-CCNC: 23 U/L
ANION GAP SERPL CALC-SCNC: 13 MMOL/L
ANION GAP SERPL CALC-SCNC: 15 MMOL/L
APPEARANCE: CLEAR
AST SERPL-CCNC: 23 U/L
AST SERPL-CCNC: 27 U/L
BACTERIA: ABNORMAL /HPF
BASOPHILS # BLD AUTO: 0.05 K/UL
BASOPHILS NFR BLD AUTO: 0.7 %
BILIRUB DIRECT SERPL-MCNC: 0.1 MG/DL
BILIRUB DIRECT SERPL-MCNC: 0.1 MG/DL
BILIRUB INDIRECT SERPL-MCNC: 0.1 MG/DL
BILIRUB INDIRECT SERPL-MCNC: 0.2 MG/DL
BILIRUB SERPL-MCNC: 0.2 MG/DL
BILIRUB SERPL-MCNC: 0.4 MG/DL
BILIRUBIN URINE: NEGATIVE
BLOOD URINE: NEGATIVE
BUN SERPL-MCNC: 32 MG/DL
BUN SERPL-MCNC: 39 MG/DL
CALCIUM SERPL-MCNC: 9.1 MG/DL
CALCIUM SERPL-MCNC: 9.2 MG/DL
CAST: 1 /LPF
CHLORIDE SERPL-SCNC: 100 MMOL/L
CHLORIDE SERPL-SCNC: 107 MMOL/L
CHOLEST SERPL-MCNC: 139 MG/DL
CK SERPL-CCNC: 64 U/L
CO2 SERPL-SCNC: 20 MMOL/L
CO2 SERPL-SCNC: 22 MMOL/L
COLOR: YELLOW
CREAT SERPL-MCNC: 1.57 MG/DL
CREAT SERPL-MCNC: 1.69 MG/DL
EGFR: 30 ML/MIN/1.73M2
EGFR: 33 ML/MIN/1.73M2
EOSINOPHIL # BLD AUTO: 0.54 K/UL
EOSINOPHIL NFR BLD AUTO: 7.1 %
EPITHELIAL CELLS: 13 /HPF
ESTIMATED AVERAGE GLUCOSE: 157 MG/DL
FERRITIN SERPL-MCNC: 30 NG/ML
GGT SERPL-CCNC: 19 U/L
GLUCOSE QUALITATIVE U: >=1000 MG/DL
GLUCOSE SERPL-MCNC: 116 MG/DL
GLUCOSE SERPL-MCNC: 136 MG/DL
HBA1C MFR BLD HPLC: 7.1 %
HCT VFR BLD CALC: 35.4 %
HCT VFR BLD CALC: 36.1 %
HDLC SERPL-MCNC: 40 MG/DL
HGB BLD-MCNC: 10.9 G/DL
HGB BLD-MCNC: 11.1 G/DL
IMM GRANULOCYTES NFR BLD AUTO: 0.4 %
IRON SATN MFR SERPL: 11 %
IRON SERPL-MCNC: 43 UG/DL
KETONES URINE: NEGATIVE MG/DL
LDLC SERPL CALC-MCNC: 69 MG/DL
LEUKOCYTE ESTERASE URINE: ABNORMAL
LYMPHOCYTES # BLD AUTO: 2.03 K/UL
LYMPHOCYTES NFR BLD AUTO: 26.7 %
MAGNESIUM SERPL-MCNC: 2.6 MG/DL
MAN DIFF?: NORMAL
MCHC RBC-ENTMCNC: 30.1 PG
MCHC RBC-ENTMCNC: 30.5 PG
MCHC RBC-ENTMCNC: 30.7 GM/DL
MCHC RBC-ENTMCNC: 30.8 GM/DL
MCV RBC AUTO: 97.8 FL
MCV RBC AUTO: 99.2 FL
MICROSCOPIC-UA: NORMAL
MONOCYTES # BLD AUTO: 0.83 K/UL
MONOCYTES NFR BLD AUTO: 10.9 %
NEUTROPHILS # BLD AUTO: 4.11 K/UL
NEUTROPHILS NFR BLD AUTO: 54.2 %
NITRITE URINE: NEGATIVE
NONHDLC SERPL-MCNC: 99 MG/DL
NT-PROBNP SERPL-MCNC: 270 PG/ML
PH URINE: 6.5
PLATELET # BLD AUTO: 228 K/UL
PLATELET # BLD AUTO: 238 K/UL
POTASSIUM SERPL-SCNC: 4.9 MMOL/L
POTASSIUM SERPL-SCNC: 5.2 MMOL/L
PROT SERPL-MCNC: 7.1 G/DL
PROT SERPL-MCNC: 7.3 G/DL
PROTEIN URINE: NORMAL MG/DL
RBC # BLD: 3.57 M/UL
RBC # BLD: 3.69 M/UL
RBC # FLD: 14.8 %
RBC # FLD: 15.1 %
RED BLOOD CELLS URINE: 1 /HPF
SODIUM SERPL-SCNC: 135 MMOL/L
SODIUM SERPL-SCNC: 141 MMOL/L
SPECIFIC GRAVITY URINE: >1.03
T4 FREE SERPL-MCNC: 1.4 NG/DL
TIBC SERPL-MCNC: 392 UG/DL
TRIGL SERPL-MCNC: 178 MG/DL
TSH SERPL-ACNC: 2.36 UIU/ML
UIBC SERPL-MCNC: 349 UG/DL
UROBILINOGEN URINE: 0.2 MG/DL
WBC # FLD AUTO: 7.59 K/UL
WBC # FLD AUTO: 8.32 K/UL
WHITE BLOOD CELLS URINE: 5 /HPF

## 2023-11-27 ENCOUNTER — NON-APPOINTMENT (OUTPATIENT)
Age: 78
End: 2023-11-27

## 2024-01-10 ENCOUNTER — APPOINTMENT (OUTPATIENT)
Dept: PULMONOLOGY | Facility: CLINIC | Age: 79
End: 2024-01-10
Payer: MEDICARE

## 2024-01-10 VITALS — OXYGEN SATURATION: 95 % | DIASTOLIC BLOOD PRESSURE: 58 MMHG | SYSTOLIC BLOOD PRESSURE: 98 MMHG | HEART RATE: 64 BPM

## 2024-01-10 PROCEDURE — ZZZZZ: CPT

## 2024-01-10 PROCEDURE — 94727 GAS DIL/WSHOT DETER LNG VOL: CPT

## 2024-01-10 PROCEDURE — 95012 NITRIC OXIDE EXP GAS DETER: CPT

## 2024-01-10 PROCEDURE — 99204 OFFICE O/P NEW MOD 45 MIN: CPT | Mod: 25

## 2024-01-10 PROCEDURE — 94729 DIFFUSING CAPACITY: CPT

## 2024-01-10 PROCEDURE — 94060 EVALUATION OF WHEEZING: CPT

## 2024-01-10 RX ORDER — ERGOCALCIFEROL 1.25 MG/1
1.25 MG CAPSULE, LIQUID FILLED ORAL
Qty: 12 | Refills: 3 | Status: ACTIVE | COMMUNITY
Start: 2024-01-10

## 2024-01-11 NOTE — ASSESSMENT
[FreeTextEntry1] : Discussed with Christina at length regarding aforementioned chest x-ray, pulmonary function test and NIOX results in the office today.  She does not appear to have pulmonary toxicity from amiodarone usage at this point. May continue amiodarone for history of atrial fibrillation from a pulmonary perspective at this point. Advised Christina to return periodically for pulmonary follow-up and pulmonary function test, while on amiodarone. Also strongly advised her to closely follow with you clinically.

## 2024-01-11 NOTE — PROCEDURE
[FreeTextEntry1] : Pulmonary Function Test performed in my office today: Spirometry: Within normal limits with no improvement postbronchodilator; Lung Volume: Within normal limits; Diffusion: Moderate impairment. ___________  Exhaled Nitric Oxide             Final  No Documents Attached    	Test  	Result  	Flag	Reference	Goal	Last Verified  	Exhaled Nitric Oxide	14	 	 		REQUIRED  Ordered by: FRITZ HILL       Collected/Examined: 10Jan2024 05:40PM       Verification Required       Stage: Final       Performed at: In Office       Performed by: FRITZ HILL       Resulted: 10Jan2024 05:40PM       Last Updated: 10Jan2024 05:40PM ___________ Report date:10/30/2023 View Order (Report matches exam selected in Patient History pane)     ACC: [id number] EXAM: XR CHEST PORTABLE URGENT 1V ORDERED BY: GRACIE QUIROZ  PROCEDURE DATE: 10/30/2023    INTERPRETATION: CLINICAL INFORMATION: Chest Pain.  TECHNIQUE: Frontal view of the chest.  COMPARISON: None.  FINDINGS:  Heart: Heart size is normal. Pulmonary: Clear lungs. No pneumothorax or pleural effusion. Bones: No acute bony pathology.  IMPRESSION: Clear lungs.  --- End of Report ---     DERRELL FISHER MD; Resident Radiologist This document has been electronically signed. KIN DOSHI MD; Attending Radiologist This document has been electronically signed. Oct 30 2023 2:43PM

## 2024-01-11 NOTE — HISTORY OF PRESENT ILLNESS
[TextBox_4] : Christina is a pleasant 78-year-old female with history of CAD, s/p 2 cardiac stents, atrial fibrillation on Xarelto and amiodarone, a fib on Xarelto/Amiodarone, s/p CAC twice in the past, CHF, PUD, she came in for pulmonary evaluation today, only complains of mild cough with throat clearing, but no chest pain, shortness of breath, fever or chills.  She is a non-smoker.

## 2024-01-11 NOTE — REASON FOR VISIT
[Consultation] : a consultation [TextBox_44] : on Amiodarone for atrial fibrillation. [TextBox_13] : Dr. Claudio Duenas

## 2024-01-11 NOTE — CONSULT LETTER
[Dear  ___] : Dear  [unfilled], [Courtesy Letter:] : I had the pleasure of seeing your patient, [unfilled], in my office today. [Please see my note below.] : Please see my note below. [Consult Closing:] : Thank you very much for allowing me to participate in the care of this patient.  If you have any questions, please do not hesitate to contact me. [Sincerely,] : Sincerely, [FreeTextEntry3] : Dr. Keli Uribe

## 2024-01-29 ENCOUNTER — APPOINTMENT (OUTPATIENT)
Dept: CARDIOLOGY | Facility: CLINIC | Age: 79
End: 2024-01-29
Payer: MEDICARE

## 2024-01-29 VITALS
BODY MASS INDEX: 32.25 KG/M2 | DIASTOLIC BLOOD PRESSURE: 58 MMHG | OXYGEN SATURATION: 98 % | HEART RATE: 55 BPM | WEIGHT: 182 LBS | TEMPERATURE: 97.9 F | SYSTOLIC BLOOD PRESSURE: 102 MMHG | HEIGHT: 63 IN

## 2024-01-29 DIAGNOSIS — Z13.228 ENCOUNTER FOR SCREENING FOR OTHER METABOLIC DISORDERS: ICD-10-CM

## 2024-01-29 DIAGNOSIS — R42 DIZZINESS AND GIDDINESS: ICD-10-CM

## 2024-01-29 DIAGNOSIS — E55.9 VITAMIN D DEFICIENCY, UNSPECIFIED: ICD-10-CM

## 2024-01-29 DIAGNOSIS — N28.9 DISORDER OF KIDNEY AND URETER, UNSPECIFIED: ICD-10-CM

## 2024-01-29 DIAGNOSIS — E83.41 HYPERMAGNESEMIA: ICD-10-CM

## 2024-01-29 PROCEDURE — 93000 ELECTROCARDIOGRAM COMPLETE: CPT

## 2024-01-29 PROCEDURE — G2211 COMPLEX E/M VISIT ADD ON: CPT

## 2024-01-29 PROCEDURE — 99214 OFFICE O/P EST MOD 30 MIN: CPT

## 2024-01-29 RX ORDER — LOSARTAN POTASSIUM 25 MG/1
25 TABLET, FILM COATED ORAL
Refills: 0 | Status: ACTIVE | COMMUNITY

## 2024-01-29 NOTE — HISTORY OF PRESENT ILLNESS
[FreeTextEntry1] : This is an 77 y/o female with a pmhx of diabetes, CAD with 2 stents, atrial fibrillation on Xarelto, PUD, CHF coming in today for follow up. Patient feels well and has no acute concerns or complaints. She reports PCP reduced losartan to 25 mg po qd due to low BP.  Patient denies chest pain, dyspnea, palpitations, dizziness, syncope, changes in bowel/bladder habits or appetite.

## 2024-01-30 ENCOUNTER — TRANSCRIPTION ENCOUNTER (OUTPATIENT)
Age: 79
End: 2024-01-30

## 2024-01-31 PROBLEM — E83.41 HYPERMAGNESEMIA: Status: ACTIVE | Noted: 2024-01-31

## 2024-01-31 LAB
25(OH)D3 SERPL-MCNC: 31.4 NG/ML
ALBUMIN SERPL ELPH-MCNC: 3.7 G/DL
ALP BLD-CCNC: 78 U/L
ALT SERPL-CCNC: 23 U/L
ANION GAP SERPL CALC-SCNC: 61 MMOL/L
APPEARANCE: CLEAR
AST SERPL-CCNC: 25 U/L
BACTERIA: ABNORMAL /HPF
BASOPHILS # BLD AUTO: 0.06 K/UL
BASOPHILS NFR BLD AUTO: 0.9 %
BILIRUB DIRECT SERPL-MCNC: 0.1 MG/DL
BILIRUB INDIRECT SERPL-MCNC: 0.1 MG/DL
BILIRUB SERPL-MCNC: 0.2 MG/DL
BILIRUBIN URINE: NEGATIVE
BLOOD URINE: NEGATIVE
BUN SERPL-MCNC: 32 MG/DL
CALCIUM SERPL-MCNC: 7.2 MG/DL
CAST: 0 /LPF
CHLORIDE SERPL-SCNC: 88 MMOL/L
CO2 SERPL-SCNC: 13 MMOL/L
COLOR: YELLOW
CREAT SERPL-MCNC: 1.53 MG/DL
EGFR: 34 ML/MIN/1.73M2
EOSINOPHIL # BLD AUTO: 0.55 K/UL
EOSINOPHIL NFR BLD AUTO: 8.4 %
EPITHELIAL CELLS: 4 /HPF
FERRITIN SERPL-MCNC: 20 NG/ML
FOLATE SERPL-MCNC: 12.8 NG/ML
GGT SERPL-CCNC: 11 U/L
GLUCOSE QUALITATIVE U: >=1000 MG/DL
GLUCOSE SERPL-MCNC: 93 MG/DL
HCT VFR BLD CALC: 31.4 %
HGB BLD-MCNC: 9.7 G/DL
IMM GRANULOCYTES NFR BLD AUTO: 0.2 %
IRON SATN MFR SERPL: 12 %
IRON SERPL-MCNC: 47 UG/DL
KETONES URINE: NEGATIVE MG/DL
LEUKOCYTE ESTERASE URINE: ABNORMAL
LYMPHOCYTES # BLD AUTO: 1.86 K/UL
LYMPHOCYTES NFR BLD AUTO: 28.4 %
MAGNESIUM SERPL-MCNC: 2.8 MG/DL
MAN DIFF?: NORMAL
MCHC RBC-ENTMCNC: 29 PG
MCHC RBC-ENTMCNC: 30.9 GM/DL
MCV RBC AUTO: 93.7 FL
MICROSCOPIC-UA: NORMAL
MONOCYTES # BLD AUTO: 0.77 K/UL
MONOCYTES NFR BLD AUTO: 11.8 %
NEUTROPHILS # BLD AUTO: 3.29 K/UL
NEUTROPHILS NFR BLD AUTO: 50.3 %
NITRITE URINE: NEGATIVE
NT-PROBNP SERPL-MCNC: 161 PG/ML
PH URINE: 5.5
PLATELET # BLD AUTO: 196 K/UL
POTASSIUM SERPL-SCNC: 4.6 MMOL/L
PROT SERPL-MCNC: 6 G/DL
PROTEIN URINE: NEGATIVE MG/DL
RBC # BLD: 3.35 M/UL
RBC # FLD: 14.8 %
RED BLOOD CELLS URINE: 0 /HPF
SODIUM SERPL-SCNC: 162 MMOL/L
SPECIFIC GRAVITY URINE: 1.03
T4 FREE SERPL-MCNC: 1.4 NG/DL
TIBC SERPL-MCNC: 381 UG/DL
TSH SERPL-ACNC: 2.03 UIU/ML
UIBC SERPL-MCNC: 334 UG/DL
UROBILINOGEN URINE: 0.2 MG/DL
VIT B12 SERPL-MCNC: 598 PG/ML
WBC # FLD AUTO: 6.54 K/UL
WHITE BLOOD CELLS URINE: 58 /HPF

## 2024-01-31 RX ORDER — IRON POLYSACCHARIDE COMPLEX 150 MG
150 CAPSULE ORAL
Qty: 90 | Refills: 0 | Status: ACTIVE | COMMUNITY
Start: 2024-01-31 | End: 1900-01-01

## 2024-02-05 ENCOUNTER — LABORATORY RESULT (OUTPATIENT)
Age: 79
End: 2024-02-05

## 2024-02-07 RX ORDER — RIVAROXABAN 15 MG/1
15 TABLET, FILM COATED ORAL
Qty: 90 | Refills: 1 | Status: ACTIVE | COMMUNITY
Start: 1900-01-01 | End: 1900-01-01

## 2024-02-22 ENCOUNTER — APPOINTMENT (OUTPATIENT)
Dept: ELECTROPHYSIOLOGY | Facility: CLINIC | Age: 79
End: 2024-02-22
Payer: MEDICARE

## 2024-02-22 ENCOUNTER — NON-APPOINTMENT (OUTPATIENT)
Age: 79
End: 2024-02-22

## 2024-02-22 VITALS — SYSTOLIC BLOOD PRESSURE: 90 MMHG | OXYGEN SATURATION: 97 % | DIASTOLIC BLOOD PRESSURE: 63 MMHG | HEART RATE: 52 BPM

## 2024-02-22 VITALS — WEIGHT: 178 LBS | BODY MASS INDEX: 31.54 KG/M2 | HEIGHT: 63 IN

## 2024-02-22 DIAGNOSIS — Z79.899 OTHER LONG TERM (CURRENT) DRUG THERAPY: ICD-10-CM

## 2024-02-22 PROCEDURE — 93000 ELECTROCARDIOGRAM COMPLETE: CPT

## 2024-02-22 PROCEDURE — 99204 OFFICE O/P NEW MOD 45 MIN: CPT

## 2024-02-22 NOTE — DISCUSSION/SUMMARY
[FreeTextEntry1] : In summary, this is a 78 year old woman with  diabetes, CAD status post-acute MI in 2021 and PCI x 2, paroxysmal atrial fibrillation on Xarelto, and heart failure.   She is referred to me by Dr. Duenas given her long-term use of amiodarone.  He would like to decrease the dose of amiodarone.  I agree that she would probably benefit from a lower dose at this time and likely can come off long-term.  Her episode of VT was in the setting of an MI and does not require long-term antiarrhythmic use.  She also had 1 episode of atrial fibrillation and we can always see how she does off the medication long-term.  Regardless for now I will decrease her amiodarone to 100 mg daily.  She will return to the office in 3 months.  Mrs. Brewster appeared to understand the whole discussion and verbalized that all of her questions were answered to her satisfaction.  Thank you for allowing me to be involved in the care of this pleasant woman. Please feel free to contact me with any questions. [EKG obtained to assist in diagnosis and management of assessed problem(s)] : EKG obtained to assist in diagnosis and management of assessed problem(s)

## 2024-02-22 NOTE — HISTORY OF PRESENT ILLNESS
[FreeTextEntry1] : Referring Physician: Claudio Duenas MD  Dear Dr. Duenas:   Mrs. Brewster was seen in the [facility] today. For our records, please allow me to summarize the history and my findings.   This pleasant 78 year old woman has a cardiovascular history significant for diabetes, CAD status post-acute MI in 2021 and PCI x 2, paroxysmal atrial fibrillation on Xarelto, and heart failure.  Her past medical history significant for peptic ulcer disease.  She reports some myocardial infarction in 2021 which was complicated by an acute episode of VT during the MI.  She also had episodes of rapid atrial fibrillation at that time.  She was started on amiodarone and has been maintained since.  She reports no other episodes of atrial fibrillation since.  She was told at that time she did not require defibrillator as her VT occurred during her MI.  She reports no dizziness palpitations or syncope.   Mrs. Brewster denies any recent history of chest pain, shortness of breath, palpitations, dizziness, or syncope.

## 2024-02-22 NOTE — CARDIOLOGY SUMMARY
[de-identified] : 2/22/2024: Sinus bradycardia at 53 bpm, first-degree AV block [de-identified] : 11/15/2023: 1. Myocardial Perfusion: Abnormal. Moderate lateral and inferolateral MI. No ischemia noted. 2. The patient underwent stress testing using pharmacological IV regadenoson (bolus injection, 400mcg over 10 to 20 seconds followed by 5ml flush) protocol. _ The total procedure time was 4 minutes . The test was stopped due to completion of protocol and shortness of breath. 3. Chest pain prior to test: no chest pain. Chest pain during pharmocologic test: no chest pain. 4. The left ventricle is normal in function and normal in size. The left ventricular EF% during stress is 53 %. The stress end diastolic volume is 108 ml and systolic volume is 50 ml. 5. Hypokinesis of the lateral and inferolateral walls. [de-identified] : 11/15/2023: 1. Left ventricular cavity is normal. Left ventricular systolic function is normal with an ejection fraction of 57 % by Michaels's method of disks. Regional wall motion abnormalities present. 2. Multiple segmental abnormalities exist. See findings. 3. There is mild (grade 1) left ventricular diastolic dysfunction. 4. Borderline left ventricular hypertrophy. 5. There is increased LV mass and concentric hypertrophy. 6. The left atrium is severely dilated. 7. Lipomatous interatrial septal hypertrophy present. 8. Calcification of the aortic valve leaflets. 9. Trace aortic regurgitation. 10. Mild to moderate pulmonic regurgitation. 11. Moderate mitral valve leaflet calcification. 12. There is mild calcification of the mitral valve annulus. 13. Mild to moderate mitral valve stenosis, secondary to dystrophic mitral annular calcification. 14. Moderate mitral regurgitation. 15. Trace tricuspid regurgitation. Pulmonary artery systolic pressure could not be estimated. 16. No pericardial effusion seen.

## 2024-02-23 ENCOUNTER — NON-APPOINTMENT (OUTPATIENT)
Age: 79
End: 2024-02-23

## 2024-02-26 ENCOUNTER — APPOINTMENT (OUTPATIENT)
Dept: UROLOGY | Facility: CLINIC | Age: 79
End: 2024-02-26
Payer: MEDICARE

## 2024-02-26 VITALS
HEIGHT: 63 IN | OXYGEN SATURATION: 96 % | TEMPERATURE: 98 F | SYSTOLIC BLOOD PRESSURE: 107 MMHG | DIASTOLIC BLOOD PRESSURE: 67 MMHG | WEIGHT: 178 LBS | HEART RATE: 63 BPM | RESPIRATION RATE: 18 BRPM | BODY MASS INDEX: 31.54 KG/M2

## 2024-02-26 DIAGNOSIS — Z78.9 OTHER SPECIFIED HEALTH STATUS: ICD-10-CM

## 2024-02-26 DIAGNOSIS — Z63.4 DISAPPEARANCE AND DEATH OF FAMILY MEMBER: ICD-10-CM

## 2024-02-26 PROCEDURE — 99204 OFFICE O/P NEW MOD 45 MIN: CPT

## 2024-02-26 PROCEDURE — 51798 US URINE CAPACITY MEASURE: CPT

## 2024-02-26 SDOH — SOCIAL STABILITY - SOCIAL INSECURITY: DISSAPEARANCE AND DEATH OF FAMILY MEMBER: Z63.4

## 2024-02-26 NOTE — REASON FOR VISIT
[TextEntry] : 78-year-old female who presents for abnormal urinalysis  Patient reports debris in her urine but otherwise denies bothersome urinary symptoms.  She reports occasional UTIs but no issues with infections.  She denies any prior urologic history.  She denies issues with emptying or straining to empty.  She denies any gross hematuria.  She is a G3, P2 via vaginal delivery.  She denies any vaginal bulge.  She denies any pelvic surgeries.  She has constipation at baseline as well as black tinge stool which may be secondary to iron.  Her history is remarkable for an MI x 2 2-1/2 years ago.  She also has diabetes  PVR 0 cc  Urinalysis 2/5 with 44 WBCs and significant glucose Urine culture negative Urinalysis with 10 WBCs and significant glucose on 1/29  Creatinine 1.7 on 1/29

## 2024-02-26 NOTE — PHYSICAL EXAM
[Normal Appearance] : normal appearance [Well Groomed] : well groomed [General Appearance - In No Acute Distress] : no acute distress [Abdomen Soft] : soft [Abdomen Tenderness] : non-tender [Normal Station and Gait] : the gait and station were normal for the patient's age [] : no rash [No Focal Deficits] : no focal deficits [Oriented To Time, Place, And Person] : oriented to person, place, and time [Affect] : the affect was normal [Mood] : the mood was normal [de-identified] : +red ulceration under pannus

## 2024-02-26 NOTE — ASSESSMENT
[FreeTextEntry1] : 78-year-old female who presents for pyuria and glucosuria  PVR was reassuring and patient denies any urinary symptoms at this time.  Advised that I recommend a renal bladder ultrasound and possibly cystoscopy.  She thinks she had a recent renal bladder ultrasound with her nephrologist and will send us those records.  I will call her when I have them.  I also encouraged her to drink water as she reports significant debris in her urine.  Will call the patient with the results from the renal bladder ultrasound

## 2024-02-26 NOTE — REVIEW OF SYSTEMS
[Feeling Tired] : feeling tired [Eyesight Problems] : eyesight problems [Heart Rate Is Fast] : fast heart rate [Constipation] : constipation [Heartburn] : heartburn [Leakage of urine with straining, coughing, laughing] : leakage of urine with straining, coughing, laughing [Feelings Of Weakness] : feelings of weakness [Negative] : Heme/Lymph

## 2024-03-05 ENCOUNTER — APPOINTMENT (OUTPATIENT)
Dept: GASTROENTEROLOGY | Facility: CLINIC | Age: 79
End: 2024-03-05
Payer: MEDICARE

## 2024-03-05 VITALS
DIASTOLIC BLOOD PRESSURE: 78 MMHG | HEIGHT: 63 IN | OXYGEN SATURATION: 99 % | SYSTOLIC BLOOD PRESSURE: 124 MMHG | TEMPERATURE: 97 F | RESPIRATION RATE: 14 BRPM | WEIGHT: 179 LBS | BODY MASS INDEX: 31.71 KG/M2 | HEART RATE: 73 BPM

## 2024-03-05 DIAGNOSIS — K59.09 OTHER CONSTIPATION: ICD-10-CM

## 2024-03-05 DIAGNOSIS — Z12.11 ENCOUNTER FOR SCREENING FOR MALIGNANT NEOPLASM OF COLON: ICD-10-CM

## 2024-03-05 DIAGNOSIS — R89.9 UNSPECIFIED ABNORMAL FINDING IN SPECIMENS FROM OTHER ORGANS, SYSTEMS AND TISSUES: ICD-10-CM

## 2024-03-05 PROCEDURE — 99204 OFFICE O/P NEW MOD 45 MIN: CPT

## 2024-03-05 NOTE — ASSESSMENT
[FreeTextEntry1] : 77 yo female, hx of iron def anemia, ob neg. Hx of MI, CHF. Never had colonoscopy. Had MI approx 2 years ago and hospitalized at [facility]; had cardiac arrest at that admission.. Latest EF 57%. Has dilated LA.- hx of atrial fibrillation on afib. Hx of constipation, long term, uses suppositories every other day. Cr. 1.72, ob neg stool in January.  IMP: 1. stable anemia, likely due to CRI, given ob neg stool 2. Colon cancer screening/constipation 3. CAD  PLAN: 1. as d/w pt, given age, cardiac hx, would proceed to virtual colonography first to which she agreed 2. Trial of linzess 72 ug po qd 3. RTO 2months 4. followup cbc/cr

## 2024-03-05 NOTE — HISTORY OF PRESENT ILLNESS
[FreeTextEntry1] : 77 yo female, hx of iron def anemia, ob neg. Hx of MI, CHF. Never had colonoscopy. Had MI approx 2 years ago and hospitalized at [facility]; had cardiac arrest at that admission.. Latest EF 57%. Has dilated LA.- hx of atrial fibrillation on afib. Hx of constipation, long term, uses suppositories every other day. Cr. 1.72, ob neg stool in January.

## 2024-03-12 LAB
ALBUMIN SERPL ELPH-MCNC: 3.7 G/DL
ALP BLD-CCNC: 118 U/L
ALT SERPL-CCNC: 13 U/L
ANION GAP SERPL CALC-SCNC: 15 MMOL/L
APPEARANCE: CLEAR
APPEARANCE: CLEAR
AST SERPL-CCNC: 25 U/L
BACTERIA UR CULT: NORMAL
BACTERIA: ABNORMAL /HPF
BACTERIA: ABNORMAL /HPF
BASOPHILS # BLD AUTO: 0.08 K/UL
BASOPHILS # BLD AUTO: 0.09 K/UL
BASOPHILS # BLD AUTO: 0.1 K/UL
BASOPHILS NFR BLD AUTO: 1.2 %
BASOPHILS NFR BLD AUTO: 1.3 %
BASOPHILS NFR BLD AUTO: 1.6 %
BILIRUB SERPL-MCNC: 0.2 MG/DL
BILIRUBIN URINE: NEGATIVE
BILIRUBIN URINE: NEGATIVE
BLOOD URINE: NEGATIVE
BLOOD URINE: NEGATIVE
BUN SERPL-MCNC: 31 MG/DL
CALCIUM SERPL-MCNC: 8.4 MG/DL
CAST: 1 /LPF
CHLORIDE SERPL-SCNC: 106 MMOL/L
CO2 SERPL-SCNC: 18 MMOL/L
COLOR: YELLOW
COLOR: YELLOW
CREAT SERPL-MCNC: 1.72 MG/DL
EGFR: 30 ML/MIN/1.73M2
EOSINOPHIL # BLD AUTO: 0.46 K/UL
EOSINOPHIL # BLD AUTO: 0.53 K/UL
EOSINOPHIL # BLD AUTO: 0.65 K/UL
EOSINOPHIL NFR BLD AUTO: 10.9 %
EOSINOPHIL NFR BLD AUTO: 7.1 %
EOSINOPHIL NFR BLD AUTO: 7.3 %
EPITHELIAL CELLS: 5 /HPF
GLUCOSE QUALITATIVE U: >=1000 MG/DL
GLUCOSE QUALITATIVE U: >=1000 MG/DL
GLUCOSE SERPL-MCNC: 199 MG/DL
HCT VFR BLD CALC: 31.8 %
HCT VFR BLD CALC: 33.2 %
HCT VFR BLD CALC: 34 %
HEMOCCULT STL QL: NEGATIVE
HGB BLD-MCNC: 10.4 G/DL
HGB BLD-MCNC: 10.4 G/DL
HGB BLD-MCNC: 9.9 G/DL
HYALINE CASTS: NORMAL
IMM GRANULOCYTES NFR BLD AUTO: 0.2 %
IMM GRANULOCYTES NFR BLD AUTO: 0.2 %
IMM GRANULOCYTES NFR BLD AUTO: 0.3 %
KETONES URINE: NEGATIVE MG/DL
KETONES URINE: NEGATIVE MG/DL
LEUKOCYTE ESTERASE URINE: ABNORMAL
LEUKOCYTE ESTERASE URINE: ABNORMAL
LYMPHOCYTES # BLD AUTO: 1.68 K/UL
LYMPHOCYTES # BLD AUTO: 1.87 K/UL
LYMPHOCYTES # BLD AUTO: 1.93 K/UL
LYMPHOCYTES NFR BLD AUTO: 25.7 %
LYMPHOCYTES NFR BLD AUTO: 28.1 %
LYMPHOCYTES NFR BLD AUTO: 29.9 %
MAGNESIUM SERPL-MCNC: 2.4 MG/DL
MAN DIFF?: NORMAL
MCHC RBC-ENTMCNC: 27.7 PG
MCHC RBC-ENTMCNC: 28.6 PG
MCHC RBC-ENTMCNC: 29.5 PG
MCHC RBC-ENTMCNC: 30.6 GM/DL
MCHC RBC-ENTMCNC: 31.1 GM/DL
MCHC RBC-ENTMCNC: 31.3 GM/DL
MCV RBC AUTO: 90.7 FL
MCV RBC AUTO: 91.9 FL
MCV RBC AUTO: 94.1 FL
MICROSCOPIC-UA: NORMAL
MICROSCOPIC-UA: NORMAL
MONOCYTES # BLD AUTO: 0.8 K/UL
MONOCYTES # BLD AUTO: 0.83 K/UL
MONOCYTES # BLD AUTO: 0.83 K/UL
MONOCYTES NFR BLD AUTO: 11.1 %
MONOCYTES NFR BLD AUTO: 13.3 %
MONOCYTES NFR BLD AUTO: 13.4 %
NEUTROPHILS # BLD AUTO: 2.75 K/UL
NEUTROPHILS # BLD AUTO: 2.99 K/UL
NEUTROPHILS # BLD AUTO: 4.1 K/UL
NEUTROPHILS NFR BLD AUTO: 46.1 %
NEUTROPHILS NFR BLD AUTO: 47.7 %
NEUTROPHILS NFR BLD AUTO: 54.6 %
NITRITE URINE: NEGATIVE
NITRITE URINE: NEGATIVE
PH URINE: 6
PH URINE: 6
PLATELET # BLD AUTO: 217 K/UL
PLATELET # BLD AUTO: 226 K/UL
PLATELET # BLD AUTO: 237 K/UL
POTASSIUM SERPL-SCNC: 4.9 MMOL/L
PROT SERPL-MCNC: 5.8 G/DL
PROTEIN URINE: 30 MG/DL
PROTEIN URINE: NEGATIVE MG/DL
RBC # BLD: 3.46 M/UL
RBC # BLD: 3.53 M/UL
RBC # BLD: 3.75 M/UL
RBC # FLD: 14.9 %
RBC # FLD: 15.1 %
RBC # FLD: 15.2 %
RED BLOOD CELLS URINE: 0 /HPF
RED BLOOD CELLS URINE: 3 /HPF
SODIUM SERPL-SCNC: 138 MMOL/L
SPECIFIC GRAVITY URINE: 1.03
SPECIFIC GRAVITY URINE: >1.03
SQUAMOUS EPITHELIAL CELLS: PRESENT
UROBILINOGEN URINE: 0.2 MG/DL
UROBILINOGEN URINE: 0.2 MG/DL
WBC # FLD AUTO: 5.97 K/UL
WBC # FLD AUTO: 6.26 K/UL
WBC # FLD AUTO: 7.5 K/UL
WHITE BLOOD CELLS URINE: 10 /HPF
WHITE BLOOD CELLS URINE: 44 /HPF

## 2024-03-18 ENCOUNTER — RX CHANGE (OUTPATIENT)
Age: 79
End: 2024-03-18

## 2024-03-18 RX ORDER — AMIODARONE HYDROCHLORIDE 100 MG/1
100 TABLET ORAL DAILY
Qty: 30 | Refills: 11 | Status: DISCONTINUED | COMMUNITY
Start: 2024-03-18 | End: 2024-03-18

## 2024-03-27 ENCOUNTER — OUTPATIENT (OUTPATIENT)
Dept: OUTPATIENT SERVICES | Facility: HOSPITAL | Age: 79
LOS: 1 days | End: 2024-03-27
Payer: MEDICARE

## 2024-03-27 ENCOUNTER — APPOINTMENT (OUTPATIENT)
Dept: CT IMAGING | Facility: CLINIC | Age: 79
End: 2024-03-27
Payer: MEDICARE

## 2024-03-27 DIAGNOSIS — Z12.11 ENCOUNTER FOR SCREENING FOR MALIGNANT NEOPLASM OF COLON: ICD-10-CM

## 2024-03-27 PROCEDURE — 74263 CT COLONOGRAPHY SCREENING: CPT

## 2024-03-27 PROCEDURE — 74263 CT COLONOGRAPHY SCREENING: CPT | Mod: 26

## 2024-04-01 ENCOUNTER — RX RENEWAL (OUTPATIENT)
Age: 79
End: 2024-04-01

## 2024-04-01 ENCOUNTER — NON-APPOINTMENT (OUTPATIENT)
Age: 79
End: 2024-04-01

## 2024-04-01 RX ORDER — IRON POLYSACCHARIDE COMPLEX 150 MG
150 CAPSULE ORAL
Qty: 90 | Refills: 0 | Status: ACTIVE | COMMUNITY
Start: 2024-04-01 | End: 1900-01-01

## 2024-05-08 ENCOUNTER — APPOINTMENT (OUTPATIENT)
Dept: PULMONOLOGY | Facility: CLINIC | Age: 79
End: 2024-05-08

## 2024-05-08 RX ORDER — LINACLOTIDE 72 UG/1
72 CAPSULE, GELATIN COATED ORAL
Qty: 90 | Refills: 0 | Status: ACTIVE | COMMUNITY
Start: 2024-03-05 | End: 1900-01-01

## 2024-05-15 ENCOUNTER — APPOINTMENT (OUTPATIENT)
Dept: CT IMAGING | Facility: CLINIC | Age: 79
End: 2024-05-15
Payer: MEDICARE

## 2024-05-15 PROCEDURE — 74176 CT ABD & PELVIS W/O CONTRAST: CPT

## 2024-05-22 ENCOUNTER — APPOINTMENT (OUTPATIENT)
Dept: ELECTROPHYSIOLOGY | Facility: CLINIC | Age: 79
End: 2024-05-22

## 2024-05-24 ENCOUNTER — APPOINTMENT (OUTPATIENT)
Dept: CARDIOLOGY | Facility: CLINIC | Age: 79
End: 2024-05-24
Payer: MEDICARE

## 2024-05-24 VITALS
BODY MASS INDEX: 31.36 KG/M2 | SYSTOLIC BLOOD PRESSURE: 120 MMHG | WEIGHT: 177 LBS | HEIGHT: 63 IN | TEMPERATURE: 99.8 F | OXYGEN SATURATION: 94 % | HEART RATE: 62 BPM | DIASTOLIC BLOOD PRESSURE: 70 MMHG

## 2024-05-24 DIAGNOSIS — D64.9 ANEMIA, UNSPECIFIED: ICD-10-CM

## 2024-05-24 DIAGNOSIS — E03.9 HYPOTHYROIDISM, UNSPECIFIED: ICD-10-CM

## 2024-05-24 DIAGNOSIS — E11.9 TYPE 2 DIABETES MELLITUS W/OUT COMPLICATIONS: ICD-10-CM

## 2024-05-24 DIAGNOSIS — R07.81 PLEURODYNIA: ICD-10-CM

## 2024-05-24 PROCEDURE — 99214 OFFICE O/P EST MOD 30 MIN: CPT

## 2024-05-24 PROCEDURE — G2211 COMPLEX E/M VISIT ADD ON: CPT

## 2024-05-24 PROCEDURE — 93000 ELECTROCARDIOGRAM COMPLETE: CPT

## 2024-05-24 NOTE — HISTORY OF PRESENT ILLNESS
[FreeTextEntry1] : This is an 80 y/o female with a pmhx of diabetes, CAD with 2 stents, atrial fibrillation on Xarelto, PUD, CHF coming in today for routine follow up. PCP Dr. Mead. Did finish Phenazopyridine tx for urinary frequency and blood in urine. CT AP performed for hematuria. Pt. states w/ gross hematuria. Did fall and injury ribs. Did have low Hb, PCP increase iron supplementation. Patient denies chest pain, dyspnea, palpitations, dizziness, syncope, changes in bowel/bladder habits or appetite.

## 2024-05-28 ENCOUNTER — APPOINTMENT (OUTPATIENT)
Dept: UROLOGY | Facility: CLINIC | Age: 79
End: 2024-05-28
Payer: MEDICARE

## 2024-05-28 ENCOUNTER — NON-APPOINTMENT (OUTPATIENT)
Age: 79
End: 2024-05-28

## 2024-05-28 VITALS
HEIGHT: 63 IN | SYSTOLIC BLOOD PRESSURE: 105 MMHG | BODY MASS INDEX: 31.36 KG/M2 | DIASTOLIC BLOOD PRESSURE: 65 MMHG | OXYGEN SATURATION: 95 % | HEART RATE: 65 BPM | WEIGHT: 177 LBS | RESPIRATION RATE: 16 BRPM

## 2024-05-28 LAB
BASOPHILS # BLD AUTO: 0.07 K/UL
BASOPHILS NFR BLD AUTO: 0.8 %
EOSINOPHIL # BLD AUTO: 0.53 K/UL
EOSINOPHIL NFR BLD AUTO: 6.3 %
HCT VFR BLD CALC: 32.4 %
HGB BLD-MCNC: 9.6 G/DL
IMM GRANULOCYTES NFR BLD AUTO: 0.4 %
LYMPHOCYTES # BLD AUTO: 1.88 K/UL
LYMPHOCYTES NFR BLD AUTO: 22.3 %
MAN DIFF?: NORMAL
MCHC RBC-ENTMCNC: 27.3 PG
MCHC RBC-ENTMCNC: 29.6 GM/DL
MCV RBC AUTO: 92 FL
MONOCYTES # BLD AUTO: 0.87 K/UL
MONOCYTES NFR BLD AUTO: 10.3 %
NEUTROPHILS # BLD AUTO: 5.04 K/UL
NEUTROPHILS NFR BLD AUTO: 59.9 %
PLATELET # BLD AUTO: 275 K/UL
RBC # BLD: 3.52 M/UL
RBC # FLD: 18.4 %
WBC # FLD AUTO: 8.42 K/UL

## 2024-05-28 PROCEDURE — G2211 COMPLEX E/M VISIT ADD ON: CPT

## 2024-05-28 PROCEDURE — 99213 OFFICE O/P EST LOW 20 MIN: CPT

## 2024-05-28 RX ORDER — LEVOTHYROXINE SODIUM 0.03 MG/1
25 TABLET ORAL DAILY
Qty: 30 | Refills: 1 | Status: ACTIVE | COMMUNITY
Start: 2024-05-28 | End: 1900-01-01

## 2024-05-28 RX ORDER — ESTRADIOL 0.1 MG/G
0.1 CREAM VAGINAL
Qty: 1 | Refills: 3 | Status: ACTIVE | COMMUNITY
Start: 2024-05-28 | End: 1900-01-01

## 2024-05-28 NOTE — ASSESSMENT
[FreeTextEntry1] : 79-year-old female who presents for hematuria episodes  We will send her urine for urinalysis, urine culture, urine cytology.  She already had a CT abdomen pelvis advised that the neck step would be a cystoscopy.  We also discussed UTI prevention which includes 1.5 L of water, vaginal estrogen cream 3 nights per week, cranberry tablets, and probiotic  Will call the patient with the results of the urine studies Return to office for cystoscopy

## 2024-05-28 NOTE — REASON FOR VISIT
[TextEntry] : 78-year-old female who presents for abnormal urinalysis  Patient reports debris in her urine but otherwise denies bothersome urinary symptoms.  She reports occasional UTIs but no issues with infections.  She denies any prior urologic history.  She denies issues with emptying or straining to empty.  She denies any gross hematuria.  She is a G3, P2 via vaginal delivery.  She denies any vaginal bulge.  She denies any pelvic surgeries.  She has constipation at baseline as well as black tinge stool which may be secondary to iron.  Her history is remarkable for an MI x 2 2-1/2 years ago.  She also has diabetes  PVR 0 cc  Urinalysis 2/5 with 44 WBCs and significant glucose Urine culture negative Urinalysis with 10 WBCs and significant glucose on 1/29  Creatinine 1.7 on 1/29 Recommended a renal bladder ultrasound and cystoscopy.  Patient reported that she had an ultrasound with her nephrologist and intended to send us the results.  5/24 creatinine 1.5 A1c 6.7%  Today on 5/28 patient reports that she has been having hematuria with associated urinary frequency urgency and dysuria.  She was seen by her outside PCP who diagnosed her with an infection.  She was treated with 2 courses of antibiotics.  However she continues to have dysuria and possible hematuria.  She was sent for a CT abdomen pelvis which was unremarkable.  Because of her kidney function, she is unable to get IV contrast.

## 2024-05-30 LAB
APPEARANCE: ABNORMAL
BACTERIA UR CULT: NORMAL
BACTERIA: NEGATIVE /HPF
BILIRUBIN URINE: NEGATIVE
BLOOD URINE: ABNORMAL
CAST: 0 /LPF
COARSE GRANULAR CASTS: PRESENT
COLOR: YELLOW
EPITHELIAL CELLS: 2 /HPF
GLUCOSE QUALITATIVE U: >=1000 MG/DL
KETONES URINE: NEGATIVE MG/DL
LEUKOCYTE ESTERASE URINE: ABNORMAL
MICROSCOPIC-UA: NORMAL
NITRITE URINE: NEGATIVE
PH URINE: 5.5
PROTEIN URINE: 100 MG/DL
RED BLOOD CELLS URINE: 326 /HPF
REVIEW: NORMAL
SPECIFIC GRAVITY URINE: 1.02
URINE CYTOLOGY: NORMAL
UROBILINOGEN URINE: 0.2 MG/DL
WHITE BLOOD CELLS URINE: 884 /HPF
YEAST-LIKE CELLS: PRESENT

## 2024-05-31 ENCOUNTER — APPOINTMENT (OUTPATIENT)
Dept: PULMONOLOGY | Facility: CLINIC | Age: 79
End: 2024-05-31
Payer: MEDICARE

## 2024-05-31 VITALS
SYSTOLIC BLOOD PRESSURE: 112 MMHG | DIASTOLIC BLOOD PRESSURE: 76 MMHG | OXYGEN SATURATION: 95 % | HEART RATE: 63 BPM | RESPIRATION RATE: 16 BRPM

## 2024-05-31 DIAGNOSIS — R07.9 CHEST PAIN, UNSPECIFIED: ICD-10-CM

## 2024-05-31 PROCEDURE — ZZZZZ: CPT

## 2024-05-31 PROCEDURE — 94727 GAS DIL/WSHOT DETER LNG VOL: CPT

## 2024-05-31 PROCEDURE — 99214 OFFICE O/P EST MOD 30 MIN: CPT | Mod: 25

## 2024-05-31 PROCEDURE — 94010 BREATHING CAPACITY TEST: CPT

## 2024-05-31 PROCEDURE — 71046 X-RAY EXAM CHEST 2 VIEWS: CPT

## 2024-05-31 PROCEDURE — 94729 DIFFUSING CAPACITY: CPT

## 2024-05-31 RX ORDER — EMPAGLIFLOZIN 10 MG/1
10 TABLET, FILM COATED ORAL DAILY
Qty: 90 | Refills: 0 | Status: DISCONTINUED | COMMUNITY
Start: 2024-05-28 | End: 2024-05-31

## 2024-05-31 NOTE — PROCEDURE
[FreeTextEntry1] : Pulmonary Function Test performed in my office today: Spirometry: Within normal limits with no improvement postbronchodilator; Lung Volume: Within normal limits; Diffusion: Moderate impairment. ______  Xray Chest 2 Views PA/Lat             Final  No Documents Attached    	 Chest x-ray PA and lateral views performed in my office today showed clear lungs, no evidence of pneumothorax, rib fractures, infiltrates or pleural effusions.  Ordered by: FRITZ HILL       Collected/Examined: [id number] 06:25PM       Verification Required       Stage: Final       Performed at: In Office       Performed by: FRITZ HILL       Resulted: [id number] 06:25PM       Last Updated: [id number] 06:27PM

## 2024-05-31 NOTE — ASSESSMENT
[FreeTextEntry1] : Obtained and reviewed pulmonary function test and chest x-ray results with patient today.  Continue amiodarone for history of atrial fibrillation.   Return for pulmonary follow up in 3 months.

## 2024-05-31 NOTE — DISCUSSION/SUMMARY
[FreeTextEntry1] : Ms. PHILIP LYNCH is an 79 year old female, history of atrial fibrillation, CAD. Reduced lung function likely secondary to splinting from chest pain secondary to recent fall.

## 2024-05-31 NOTE — ADDENDUM
[FreeTextEntry1] : I, Nichol Be, acted solely as a scribe for Dr. Keli Uribe D.O., on this date 05/31/2024.   All medical record entries made by the Scribe were at my, Dr. Keli Uribe D.O., direction and personally dictated by me on 05/31/2024. I have reviewed the chart and agree that the record accurately reflects my personal performance of the history, physical exam, assessment and plan. I have also personally directed, reviewed, and agreed with the chart.

## 2024-05-31 NOTE — HISTORY OF PRESENT ILLNESS
[TextBox_4] : PHILIP LYNCH is a 79 year old female, with history of atrial fibrillation, CAD, who presents to the office for follow up evaluation. Patient reports that she is feeling well overall with no respiratory complaints. She denies of having any symptoms of chest pain, dyspnea, or a cough. Patient reports that she continues to take Amiodarone for atrial fibrillation. She states of suffering from a recent fall 3 weeks ago which she has recovered from although continues to experience rib pain.

## 2024-06-03 ENCOUNTER — APPOINTMENT (OUTPATIENT)
Dept: UROLOGY | Facility: CLINIC | Age: 79
End: 2024-06-03

## 2024-06-05 ENCOUNTER — NON-APPOINTMENT (OUTPATIENT)
Age: 79
End: 2024-06-05

## 2024-06-05 ENCOUNTER — APPOINTMENT (OUTPATIENT)
Dept: ELECTROPHYSIOLOGY | Facility: CLINIC | Age: 79
End: 2024-06-05
Payer: MEDICARE

## 2024-06-05 VITALS — DIASTOLIC BLOOD PRESSURE: 78 MMHG | SYSTOLIC BLOOD PRESSURE: 138 MMHG | OXYGEN SATURATION: 95 % | HEART RATE: 62 BPM

## 2024-06-05 DIAGNOSIS — I48.91 UNSPECIFIED ATRIAL FIBRILLATION: ICD-10-CM

## 2024-06-05 DIAGNOSIS — I25.10 ATHEROSCLEROTIC HEART DISEASE OF NATIVE CORONARY ARTERY W/OUT ANGINA PECTORIS: ICD-10-CM

## 2024-06-05 DIAGNOSIS — I10 ESSENTIAL (PRIMARY) HYPERTENSION: ICD-10-CM

## 2024-06-05 DIAGNOSIS — I50.9 HEART FAILURE, UNSPECIFIED: ICD-10-CM

## 2024-06-05 DIAGNOSIS — I47.29 OTHER VENTRICULAR TACHYCARDIA: ICD-10-CM

## 2024-06-05 PROCEDURE — 93000 ELECTROCARDIOGRAM COMPLETE: CPT

## 2024-06-05 PROCEDURE — 99214 OFFICE O/P EST MOD 30 MIN: CPT

## 2024-06-05 RX ORDER — AMIODARONE HYDROCHLORIDE 100 MG/1
100 TABLET ORAL
Qty: 90 | Refills: 4 | Status: DISCONTINUED | COMMUNITY
Start: 2024-03-18 | End: 2024-06-05

## 2024-06-06 PROBLEM — I10 HTN (HYPERTENSION): Status: ACTIVE | Noted: 2023-11-07

## 2024-06-06 PROBLEM — I25.10 CAD (CORONARY ARTERY DISEASE): Status: ACTIVE | Noted: 2023-11-07

## 2024-06-06 NOTE — CARDIOLOGY SUMMARY
[de-identified] : 6/5/24: NSR 60,  first-degree AV block 2/22/2024: Sinus bradycardia at 53 bpm, first-degree AV block [de-identified] : 11/15/2023: 1. Myocardial Perfusion: Abnormal. Moderate lateral and inferolateral MI. No ischemia noted. 2. The patient underwent stress testing using pharmacological IV regadenoson (bolus injection, 400mcg over 10 to 20 seconds followed by 5ml flush) protocol. _ The total procedure time was 4 minutes . The test was stopped due to completion of protocol and shortness of breath. 3. Chest pain prior to test: no chest pain. Chest pain during pharmocologic test: no chest pain. 4. The left ventricle is normal in function and normal in size. The left ventricular EF% during stress is 53 %. The stress end diastolic volume is 108 ml and systolic volume is 50 ml. 5. Hypokinesis of the lateral and inferolateral walls. [de-identified] : 11/15/2023: 1. Left ventricular cavity is normal. Left ventricular systolic function is normal with an ejection fraction of 57 % by Michaels's method of disks. Regional wall motion abnormalities present. 2. Multiple segmental abnormalities exist. See findings. 3. There is mild (grade 1) left ventricular diastolic dysfunction. 4. Borderline left ventricular hypertrophy. 5. There is increased LV mass and concentric hypertrophy. 6. The left atrium is severely dilated. 7. Lipomatous interatrial septal hypertrophy present. 8. Calcification of the aortic valve leaflets. 9. Trace aortic regurgitation. 10. Mild to moderate pulmonic regurgitation. 11. Moderate mitral valve leaflet calcification. 12. There is mild calcification of the mitral valve annulus. 13. Mild to moderate mitral valve stenosis, secondary to dystrophic mitral annular calcification. 14. Moderate mitral regurgitation. 15. Trace tricuspid regurgitation. Pulmonary artery systolic pressure could not be estimated. 16. No pericardial effusion seen.

## 2024-06-06 NOTE — DISCUSSION/SUMMARY
[EKG obtained to assist in diagnosis and management of assessed problem(s)] : EKG obtained to assist in diagnosis and management of assessed problem(s) [FreeTextEntry1] : In summary, this is a 79 year old woman with diabetes, CAD status post-acute MI in 2021 and PCI x 2, paroxysmal atrial fibrillation on Xarelto, and heart failure.   She was referred to me by Dr. Duenas given her long-term use of amiodarone.  Last visit, she decreased dose to 100 mg daily and has been feeling well with no palpitations.  I agree that she can come off Amiodarone at this time. Her prior episode of VT was in the setting of an MI and does not require long-term antiarrhythmic use.  She also had 1 episode of atrial fibrillation and we can always see how she does off the medication long-term.  She will discontinue amiodarone after upcoming cystoscopy. She will return to the office in 3 months.  Mrs. Brewster appeared to understand the whole discussion and verbalized that all of her questions were answered to her satisfaction.  Thank you for allowing me to be involved in the care of this pleasant woman. Please feel free to contact me with any questions.

## 2024-06-06 NOTE — HISTORY OF PRESENT ILLNESS
[FreeTextEntry1] : Referring Physician: Claudio Duenas MD  Dear Dr. Duenas:   Mrs. Brewster was seen in the [facility] today. For our records, please allow me to summarize the history and my findings.   This pleasant 79 year old woman has a cardiovascular history significant for diabetes, CAD status post-acute MI in 2021 and PCI x 2, paroxysmal atrial fibrillation on Xarelto, and heart failure.  Her past medical history significant for peptic ulcer disease.  She reports some myocardial infarction in 2021 which was complicated by an acute episode of VT during the MI.  She also had episodes of rapid atrial fibrillation at that time.  She was started on amiodarone and has been maintained since.  She reports no other episodes of atrial fibrillation since.  She was told at that time she did not require defibrillator as her VT occurred during her MI.  She reports no dizziness palpitations or syncope.  Her Amio was decreased during her last office visit.   Mrs. Brewster denies any recent history of chest pain, shortness of breath, palpitations, dizziness, or syncope.

## 2024-06-06 NOTE — PHYSICAL EXAM

## 2024-06-09 ENCOUNTER — LABORATORY RESULT (OUTPATIENT)
Age: 79
End: 2024-06-09

## 2024-06-09 LAB
ALBUMIN SERPL ELPH-MCNC: 3.8 G/DL
ALBUMIN SERPL ELPH-MCNC: 3.8 G/DL
ALP BLD-CCNC: 134 U/L
ALP BLD-CCNC: 139 U/L
ALT SERPL-CCNC: 12 U/L
ALT SERPL-CCNC: 13 U/L
ANION GAP SERPL CALC-SCNC: 13 MMOL/L
ANION GAP SERPL CALC-SCNC: 14 MMOL/L
AST SERPL-CCNC: 15 U/L
AST SERPL-CCNC: 16 U/L
BILIRUB DIRECT SERPL-MCNC: 0.1 MG/DL
BILIRUB INDIRECT SERPL-MCNC: 0.2 MG/DL
BILIRUB SERPL-MCNC: 0.3 MG/DL
BILIRUB SERPL-MCNC: 0.3 MG/DL
BUN SERPL-MCNC: 17 MG/DL
BUN SERPL-MCNC: 18 MG/DL
CALCIUM SERPL-MCNC: 8.9 MG/DL
CALCIUM SERPL-MCNC: 9.1 MG/DL
CHLORIDE SERPL-SCNC: 107 MMOL/L
CHLORIDE SERPL-SCNC: 107 MMOL/L
CHOLEST SERPL-MCNC: 136 MG/DL
CK SERPL-CCNC: 59 U/L
CO2 SERPL-SCNC: 18 MMOL/L
CO2 SERPL-SCNC: 19 MMOL/L
CREAT SERPL-MCNC: 1.49 MG/DL
CREAT SERPL-MCNC: 1.51 MG/DL
EGFR: 35 ML/MIN/1.73M2
EGFR: 35 ML/MIN/1.73M2
ESTIMATED AVERAGE GLUCOSE: 146 MG/DL
FERRITIN SERPL-MCNC: 48 NG/ML
FOLATE SERPL-MCNC: 10.5 NG/ML
GLUCOSE SERPL-MCNC: 139 MG/DL
GLUCOSE SERPL-MCNC: 141 MG/DL
HBA1C MFR BLD HPLC: 6.7 %
HDLC SERPL-MCNC: 43 MG/DL
IRON SATN MFR SERPL: 42 %
IRON SERPL-MCNC: 152 UG/DL
LDLC SERPL CALC-MCNC: 73 MG/DL
MAGNESIUM SERPL-MCNC: 2.5 MG/DL
NONHDLC SERPL-MCNC: 94 MG/DL
NT-PROBNP SERPL-MCNC: 1319 PG/ML
POTASSIUM SERPL-SCNC: 5.2 MMOL/L
POTASSIUM SERPL-SCNC: 5.3 MMOL/L
PROT SERPL-MCNC: 6.6 G/DL
PROT SERPL-MCNC: 6.7 G/DL
SODIUM SERPL-SCNC: 139 MMOL/L
SODIUM SERPL-SCNC: 140 MMOL/L
T4 FREE SERPL-MCNC: 1.8 NG/DL
TIBC SERPL-MCNC: 360 UG/DL
TRANSFERRIN SERPL-MCNC: 288 MG/DL
TRIGL SERPL-MCNC: 115 MG/DL
TSH SERPL-ACNC: 0.06 UIU/ML
UIBC SERPL-MCNC: 208 UG/DL
VIT B12 SERPL-MCNC: 856 PG/ML

## 2024-06-10 ENCOUNTER — APPOINTMENT (OUTPATIENT)
Dept: UROLOGY | Facility: CLINIC | Age: 79
End: 2024-06-10
Payer: MEDICARE

## 2024-06-10 VITALS
BODY MASS INDEX: 31.36 KG/M2 | OXYGEN SATURATION: 97 % | WEIGHT: 177 LBS | SYSTOLIC BLOOD PRESSURE: 128 MMHG | RESPIRATION RATE: 16 BRPM | DIASTOLIC BLOOD PRESSURE: 76 MMHG | HEIGHT: 63 IN | HEART RATE: 53 BPM

## 2024-06-10 DIAGNOSIS — R82.90 UNSPECIFIED ABNORMAL FINDINGS IN URINE: ICD-10-CM

## 2024-06-10 DIAGNOSIS — R31.9 HEMATURIA, UNSPECIFIED: ICD-10-CM

## 2024-06-10 DIAGNOSIS — N39.0 URINARY TRACT INFECTION, SITE NOT SPECIFIED: ICD-10-CM

## 2024-06-10 PROCEDURE — 52000 CYSTOURETHROSCOPY: CPT

## 2024-06-17 DIAGNOSIS — B37.49 OTHER UROGENITAL CANDIDIASIS: ICD-10-CM

## 2024-06-17 LAB
APPEARANCE: ABNORMAL
BACTERIA: ABNORMAL /HPF
BILIRUBIN URINE: NEGATIVE
BLOOD URINE: ABNORMAL
CAST: 0 /LPF
COLOR: YELLOW
EPITHELIAL CELLS: 3 /HPF
GLUCOSE QUALITATIVE U: >=1000 MG/DL
KETONES URINE: NEGATIVE MG/DL
LEUKOCYTE ESTERASE URINE: ABNORMAL
MICROSCOPIC-UA: NORMAL
NITRITE URINE: NEGATIVE
PH URINE: 6
PROTEIN URINE: 100 MG/DL
RED BLOOD CELLS URINE: 561 /HPF
REVIEW: NORMAL
SPECIFIC GRAVITY URINE: 1.03
UROBILINOGEN URINE: 1 MG/DL
WHITE BLOOD CELLS URINE: >998 /HPF
YEAST-LIKE CELLS: PRESENT

## 2024-06-17 RX ORDER — FLUCONAZOLE 200 MG/1
200 TABLET ORAL DAILY
Qty: 10 | Refills: 0 | Status: ACTIVE | COMMUNITY
Start: 2024-06-17 | End: 1900-01-01

## 2024-07-02 LAB
APPEARANCE: CLEAR
BACTERIA: NEGATIVE /HPF
BILIRUBIN URINE: NEGATIVE
BLOOD URINE: ABNORMAL
CAST: 0 /LPF
COLOR: YELLOW
EPITHELIAL CELLS: 4 /HPF
GLUCOSE QUALITATIVE U: >=1000 MG/DL
KETONES URINE: NEGATIVE MG/DL
LEUKOCYTE ESTERASE URINE: ABNORMAL
MICROSCOPIC-UA: NORMAL
NITRITE URINE: NEGATIVE
PH URINE: 6.5
PROTEIN URINE: 30 MG/DL
RED BLOOD CELLS URINE: 64 /HPF
SPECIFIC GRAVITY URINE: 1.03
UROBILINOGEN URINE: 0.2 MG/DL
WHITE BLOOD CELLS URINE: 38 /HPF

## 2024-08-09 ENCOUNTER — LABORATORY RESULT (OUTPATIENT)
Age: 79
End: 2024-08-09

## 2024-08-27 ENCOUNTER — APPOINTMENT (OUTPATIENT)
Dept: PULMONOLOGY | Facility: CLINIC | Age: 79
End: 2024-08-27

## 2024-08-30 ENCOUNTER — APPOINTMENT (OUTPATIENT)
Dept: GASTROENTEROLOGY | Facility: CLINIC | Age: 79
End: 2024-08-30
Payer: MEDICARE

## 2024-08-30 ENCOUNTER — APPOINTMENT (OUTPATIENT)
Dept: PULMONOLOGY | Facility: CLINIC | Age: 79
End: 2024-08-30
Payer: MEDICARE

## 2024-08-30 VITALS
WEIGHT: 177 LBS | BODY MASS INDEX: 31.36 KG/M2 | HEIGHT: 63 IN | DIASTOLIC BLOOD PRESSURE: 60 MMHG | SYSTOLIC BLOOD PRESSURE: 96 MMHG | OXYGEN SATURATION: 99 % | RESPIRATION RATE: 14 BRPM | HEART RATE: 67 BPM | TEMPERATURE: 98 F

## 2024-08-30 VITALS — DIASTOLIC BLOOD PRESSURE: 61 MMHG | OXYGEN SATURATION: 98 % | SYSTOLIC BLOOD PRESSURE: 93 MMHG | HEART RATE: 59 BPM

## 2024-08-30 DIAGNOSIS — K63.5 POLYP OF COLON: ICD-10-CM

## 2024-08-30 DIAGNOSIS — R07.9 CHEST PAIN, UNSPECIFIED: ICD-10-CM

## 2024-08-30 DIAGNOSIS — I48.91 UNSPECIFIED ATRIAL FIBRILLATION: ICD-10-CM

## 2024-08-30 DIAGNOSIS — R89.9 UNSPECIFIED ABNORMAL FINDING IN SPECIMENS FROM OTHER ORGANS, SYSTEMS AND TISSUES: ICD-10-CM

## 2024-08-30 DIAGNOSIS — K59.09 OTHER CONSTIPATION: ICD-10-CM

## 2024-08-30 DIAGNOSIS — I25.10 ATHEROSCLEROTIC HEART DISEASE OF NATIVE CORONARY ARTERY W/OUT ANGINA PECTORIS: ICD-10-CM

## 2024-08-30 PROCEDURE — 94729 DIFFUSING CAPACITY: CPT

## 2024-08-30 PROCEDURE — 99214 OFFICE O/P EST MOD 30 MIN: CPT

## 2024-08-30 PROCEDURE — 94010 BREATHING CAPACITY TEST: CPT

## 2024-08-30 PROCEDURE — 99214 OFFICE O/P EST MOD 30 MIN: CPT | Mod: 25

## 2024-08-30 PROCEDURE — 94727 GAS DIL/WSHOT DETER LNG VOL: CPT

## 2024-08-30 PROCEDURE — 95012 NITRIC OXIDE EXP GAS DETER: CPT

## 2024-08-30 PROCEDURE — ZZZZZ: CPT

## 2024-08-30 NOTE — REASON FOR VISIT
[Initial Evaluation] : an initial evaluation [Follow-up] : a follow-up of an existing diagnosis [FreeTextEntry1] : anemia, two dim polyps

## 2024-08-30 NOTE — ASSESSMENT
[FreeTextEntry1] : 78 yo female, hx of iron def anemia, ob neg. Hx of MI, CHF. Never had colonoscopy. Had MI approx 2 years ago and hospitalized at [facility]; had cardiac arrest at that admission.. Latest EF 57%. Has dilated LA.- hx of atrial fibrillation on afib. Hx of constipation, long term, uses suppositories every other day. Cr. 1.72, ob neg stool in January.  RTO 8/29/24- Colonography from april reviwed; two 6 mm polyps identifed, asc colon. No rectal bleeding. Hx of MI, afib. Last hb 9.6 May 2024. Had cbc with primary and dtr states was 11..  IMP: 1. improved anemia, likely due to CRI, given ob neg stool 2 constipation improved.  PLAN: 1. as d/w pt, given age, cardiac hx, would not address the two dim polyps as risk of sedation with colonoscopy is significant. Pt and dtr agree 2.  linzess 72 ug po qd 3. RTO 6 months 4. followup cbc/cr at next visit with Dr. Lopez

## 2024-08-30 NOTE — PHYSICAL EXAM
[Alert] : alert [Normal Voice/Communication] : normal voice/communication [Healthy Appearing] : healthy appearing [No Acute Distress] : no acute distress [Sclera] : the sclera and conjunctiva were normal [Normal Lips/Gums] : the lips and gums were normal [Hearing Threshold Finger Rub Not [place]] : hearing was normal [Oropharynx] : the oropharynx was normal [Normal Appearance] : the appearance of the neck was normal [No Neck Mass] : no neck mass was observed [No Respiratory Distress] : no respiratory distress [No Acc Muscle Use] : no accessory muscle use [Respiration, Rhythm And Depth] : normal respiratory rhythm and effort [Auscultation Breath Sounds / Voice Sounds] : lungs were clear to auscultation bilaterally [Heart Rate And Rhythm] : heart rate was normal and rhythm regular [Normal S1, S2] : normal S1 and S2 [Murmurs] : no murmurs [Bowel Sounds] : normal bowel sounds [Abdomen Tenderness] : non-tender [No Masses] : no abdominal mass palpated [Abdomen Soft] : soft [] : no hepatosplenomegaly [Oriented To Time, Place, And Person] : oriented to person, place, and time

## 2024-08-30 NOTE — HISTORY OF PRESENT ILLNESS
[FreeTextEntry1] : 78 yo female, hx of iron def anemia, ob neg. Hx of MI, CHF. Never had colonoscopy. Had MI approx 2 years ago and hospitalized at [facility]; had cardiac arrest at that admission.. Latest EF 57%. Has dilated LA.- hx of atrial fibrillation on afib. Hx of constipation, long term, uses suppositories every other day. Cr. 1.72, ob neg stool in January.  RTO 8/29/24- Colonography from april reviwed; two 6 mm polyps identifed, asc colon. No rectal bleeding. Hx of MI, afib. Last hb 9.6 May 2024. Had cbc with primary and dtr states was 11. Still on asa 81 mg and xarelto. Amidiorone d/c. Has good bm with Linzess [de-identified] : 04/2024 virtual colonoscopy - 2 6mm polyps

## 2024-08-31 NOTE — ASSESSMENT
[FreeTextEntry1] : Obtained and reviewed pulmonary function test and NIOX results with patient today.  Off amiodarone for history of atrial fibrillation.   Return for pulmonary follow up in 6 months.

## 2024-08-31 NOTE — HISTORY OF PRESENT ILLNESS
[TextBox_4] : PHILIP LYNCH is a 79 year old female, with history of atrial fibrillation, CAD, who presents to the office for follow up evaluation. Patient reports that she is feeling well overall with no respiratory complaints. She denies of having any symptoms of chest pain, dyspnea, or a cough. Patient reports that she is off amiodarone now.  No chest pain, shortness of breath, or cough.

## 2024-08-31 NOTE — PROCEDURE
[FreeTextEntry1] : Pulmonary Function Test performed in my office today: Spirometry: Within normal limits with no improvement postbronchodilator; Lung Volume: Within normal limits; Diffusion: Moderate impairment. ______  Exhaled Nitric Oxide             Final  No Documents Attached    	Test  	Result  	Flag	Reference	Goal	Last Verified  	Exhaled Nitric Oxide	16	 	 		REQUIRED  Ordered by: FRITZ HILL       Collected/Examined: [id number] 11:41AM       Verification Required       Stage: Final       Performed at: In Office       Performed by: FRITZ HILL       Resulted: [id number] 11:41AM       Last Updated: [id number] 11:41AM

## 2024-08-31 NOTE — PROCEDURE
[FreeTextEntry1] : Pulmonary Function Test performed in my office today: Spirometry: Within normal limits with no improvement postbronchodilator; Lung Volume: Within normal limits; Diffusion: Moderate impairment. ______  Exhaled Nitric Oxide             Final  No Documents Attached    	Test  	Result  	Flag	Reference	Goal	Last Verified  	Exhaled Nitric Oxide	16	 	 		REQUIRED  Ordered by: FRITZ HILL       Collected/Examined: [id number] 11:41AM       Verification Required       Stage: Final       Performed at: In Office       Performed by: FRITZ HILL       Resulted: [id number] 11:41AM       Last Updated: [id number] 11:41AM

## 2024-09-06 ENCOUNTER — APPOINTMENT (OUTPATIENT)
Dept: UROLOGY | Facility: CLINIC | Age: 79
End: 2024-09-06
Payer: MEDICARE

## 2024-09-06 ENCOUNTER — NON-APPOINTMENT (OUTPATIENT)
Age: 79
End: 2024-09-06

## 2024-09-06 DIAGNOSIS — N32.81 OVERACTIVE BLADDER: ICD-10-CM

## 2024-09-06 DIAGNOSIS — N39.0 URINARY TRACT INFECTION, SITE NOT SPECIFIED: ICD-10-CM

## 2024-09-06 PROCEDURE — 52000 CYSTOURETHROSCOPY: CPT

## 2024-09-06 PROCEDURE — 99213 OFFICE O/P EST LOW 20 MIN: CPT | Mod: 25

## 2024-09-06 RX ORDER — MIRABEGRON 25 MG/1
25 TABLET, EXTENDED RELEASE ORAL
Qty: 30 | Refills: 6 | Status: ACTIVE | COMMUNITY
Start: 2024-09-06 | End: 1900-01-01

## 2024-09-11 LAB
APPEARANCE: ABNORMAL
BACTERIA UR CULT: ABNORMAL
BACTERIA: ABNORMAL /HPF
BILIRUBIN URINE: NEGATIVE
BLOOD URINE: ABNORMAL
CAST: 1 /LPF
COLOR: YELLOW
EPITHELIAL CELLS: 0 /HPF
GLUCOSE QUALITATIVE U: >=1000 MG/DL
KETONES URINE: NEGATIVE MG/DL
LEUKOCYTE ESTERASE URINE: ABNORMAL
MICROSCOPIC-UA: NORMAL
NITRITE URINE: POSITIVE
PH URINE: 6.5
PROTEIN URINE: NORMAL MG/DL
RED BLOOD CELLS URINE: 15 /HPF
REVIEW: NORMAL
SPECIFIC GRAVITY URINE: 1.02
URINE CYTOLOGY: NORMAL
UROBILINOGEN URINE: 0.2 MG/DL
WHITE BLOOD CELLS URINE: 111 /HPF

## 2024-09-11 RX ORDER — SULFAMETHOXAZOLE AND TRIMETHOPRIM 800; 160 MG/1; MG/1
800-160 TABLET ORAL
Qty: 10 | Refills: 0 | Status: ACTIVE | COMMUNITY
Start: 2024-09-11 | End: 1900-01-01

## 2024-09-17 RX ORDER — EMPAGLIFLOZIN 25 MG/1
25 TABLET, FILM COATED ORAL
Qty: 90 | Refills: 1 | Status: ACTIVE | COMMUNITY
Start: 2024-09-17 | End: 1900-01-01

## 2024-09-24 ENCOUNTER — APPOINTMENT (OUTPATIENT)
Dept: CARDIOLOGY | Facility: CLINIC | Age: 79
End: 2024-09-24

## 2024-09-25 ENCOUNTER — NON-APPOINTMENT (OUTPATIENT)
Age: 79
End: 2024-09-25

## 2024-09-25 ENCOUNTER — APPOINTMENT (OUTPATIENT)
Dept: ELECTROPHYSIOLOGY | Facility: CLINIC | Age: 79
End: 2024-09-25

## 2024-09-25 VITALS
DIASTOLIC BLOOD PRESSURE: 67 MMHG | HEART RATE: 61 BPM | SYSTOLIC BLOOD PRESSURE: 110 MMHG | WEIGHT: 177 LBS | HEIGHT: 63 IN | OXYGEN SATURATION: 96 % | BODY MASS INDEX: 31.36 KG/M2

## 2024-09-25 DIAGNOSIS — I47.29 OTHER VENTRICULAR TACHYCARDIA: ICD-10-CM

## 2024-09-25 DIAGNOSIS — I50.9 HEART FAILURE, UNSPECIFIED: ICD-10-CM

## 2024-09-25 DIAGNOSIS — I10 ESSENTIAL (PRIMARY) HYPERTENSION: ICD-10-CM

## 2024-09-25 PROCEDURE — 99214 OFFICE O/P EST MOD 30 MIN: CPT

## 2024-09-25 PROCEDURE — G2211 COMPLEX E/M VISIT ADD ON: CPT

## 2024-09-25 PROCEDURE — 93000 ELECTROCARDIOGRAM COMPLETE: CPT

## 2024-10-01 NOTE — PHYSICAL EXAM

## 2024-10-01 NOTE — HISTORY OF PRESENT ILLNESS
[FreeTextEntry1] : Referring Physician: Claudio Duenas MD  Dear Dr. Duenas:   Mrs. Brewster was seen in the [facility] today. For our records, please allow me to summarize the history and my findings.   This pleasant 79 year old woman has a cardiovascular history significant for diabetes, CAD status post-acute MI in 2021 and PCI x 2, paroxysmal atrial fibrillation on Xarelto, and heart failure.  Her past medical history significant for peptic ulcer disease.  She reports some myocardial infarction in 2021 which was complicated by an acute episode of VT during the MI.  She also had episodes of rapid atrial fibrillation at that time.  She was started on amiodarone and was maintained on until 6/2024.  She reports no other episodes of atrial fibrillation since.  She was told at that time she did not require defibrillator as her VT occurred during her MI.  She reports no dizziness palpitations or syncope.  Her Amio was discontinued during her last office visit 6/2024.  She has been feeling well since with no palpitations but did have a recent UTI with some blood in her urine that has subsided.    Mrs. Brewster denies any recent history of chest pain, shortness of breath, palpitations, dizziness, or syncope.

## 2024-10-01 NOTE — CARDIOLOGY SUMMARY
[de-identified] : 9/25/24 Sinus bradycardia at 55 bpm, first-degree AV block 6/5/24: NSR 60,  first-degree AV block 2/22/2024: Sinus bradycardia at 53 bpm, first-degree AV block [de-identified] : 11/15/2023: 1. Myocardial Perfusion: Abnormal. Moderate lateral and inferolateral MI. No ischemia noted. 2. The patient underwent stress testing using pharmacological IV regadenoson (bolus injection, 400mcg over 10 to 20 seconds followed by 5ml flush) protocol. _ The total procedure time was 4 minutes . The test was stopped due to completion of protocol and shortness of breath. 3. Chest pain prior to test: no chest pain. Chest pain during pharmocologic test: no chest pain. 4. The left ventricle is normal in function and normal in size. The left ventricular EF% during stress is 53 %. The stress end diastolic volume is 108 ml and systolic volume is 50 ml. 5. Hypokinesis of the lateral and inferolateral walls. [de-identified] : 11/15/2023: 1. Left ventricular cavity is normal. Left ventricular systolic function is normal with an ejection fraction of 57 % by Michaels's method of disks. Regional wall motion abnormalities present. 2. Multiple segmental abnormalities exist. See findings. 3. There is mild (grade 1) left ventricular diastolic dysfunction. 4. Borderline left ventricular hypertrophy. 5. There is increased LV mass and concentric hypertrophy. 6. The left atrium is severely dilated. 7. Lipomatous interatrial septal hypertrophy present. 8. Calcification of the aortic valve leaflets. 9. Trace aortic regurgitation. 10. Mild to moderate pulmonic regurgitation. 11. Moderate mitral valve leaflet calcification. 12. There is mild calcification of the mitral valve annulus. 13. Mild to moderate mitral valve stenosis, secondary to dystrophic mitral annular calcification. 14. Moderate mitral regurgitation. 15. Trace tricuspid regurgitation. Pulmonary artery systolic pressure could not be estimated. 16. No pericardial effusion seen.

## 2024-10-01 NOTE — CARDIOLOGY SUMMARY
[de-identified] : 9/25/24 Sinus bradycardia at 55 bpm, first-degree AV block 6/5/24: NSR 60,  first-degree AV block 2/22/2024: Sinus bradycardia at 53 bpm, first-degree AV block [de-identified] : 11/15/2023: 1. Myocardial Perfusion: Abnormal. Moderate lateral and inferolateral MI. No ischemia noted. 2. The patient underwent stress testing using pharmacological IV regadenoson (bolus injection, 400mcg over 10 to 20 seconds followed by 5ml flush) protocol. _ The total procedure time was 4 minutes . The test was stopped due to completion of protocol and shortness of breath. 3. Chest pain prior to test: no chest pain. Chest pain during pharmocologic test: no chest pain. 4. The left ventricle is normal in function and normal in size. The left ventricular EF% during stress is 53 %. The stress end diastolic volume is 108 ml and systolic volume is 50 ml. 5. Hypokinesis of the lateral and inferolateral walls. [de-identified] : 11/15/2023: 1. Left ventricular cavity is normal. Left ventricular systolic function is normal with an ejection fraction of 57 % by Michaels's method of disks. Regional wall motion abnormalities present. 2. Multiple segmental abnormalities exist. See findings. 3. There is mild (grade 1) left ventricular diastolic dysfunction. 4. Borderline left ventricular hypertrophy. 5. There is increased LV mass and concentric hypertrophy. 6. The left atrium is severely dilated. 7. Lipomatous interatrial septal hypertrophy present. 8. Calcification of the aortic valve leaflets. 9. Trace aortic regurgitation. 10. Mild to moderate pulmonic regurgitation. 11. Moderate mitral valve leaflet calcification. 12. There is mild calcification of the mitral valve annulus. 13. Mild to moderate mitral valve stenosis, secondary to dystrophic mitral annular calcification. 14. Moderate mitral regurgitation. 15. Trace tricuspid regurgitation. Pulmonary artery systolic pressure could not be estimated. 16. No pericardial effusion seen.

## 2024-10-01 NOTE — HISTORY OF PRESENT ILLNESS
[FreeTextEntry1] : Referring Physician: Claudio Duenas MD  Dear Dr. Duenas:   Mrs. Brewster was seen in the [facility] today. For our records, please allow me to summarize the history and my findings.   This pleasant 79 year old woman has a cardiovascular history significant for diabetes, CAD status post-acute MI in 2021 and PCI x 2, paroxysmal atrial fibrillation on Xarelto, and heart failure.  Her past medical history significant for peptic ulcer disease.  She reports some myocardial infarction in 2021 which was complicated by an acute episode of VT during the MI.  She also had episodes of rapid atrial fibrillation at that time.  She was started on amiodarone and was maintained on until 6/2024.  She reports no other episodes of atrial fibrillation since.  She was told at that time she did not require defibrillator as her VT occurred during her MI.  She reports no dizziness palpitations or syncope.  Her Amio was discontinued during her last office visit 6/2024.  She has been feeling well since with no palpitations but did have a recent UTI with some blood in her urine that has subsided.    Mrs. Brewster denies any recent history of chest pain, shortness of breath, palpitations, dizziness, or syncope.

## 2024-10-01 NOTE — PHYSICAL EXAM

## 2024-10-01 NOTE — DISCUSSION/SUMMARY
[EKG obtained to assist in diagnosis and management of assessed problem(s)] : EKG obtained to assist in diagnosis and management of assessed problem(s) [FreeTextEntry1] : In summary, this is a 79 year old woman with diabetes, CAD status post-acute MI in 2021 and PCI x 2, paroxysmal atrial fibrillation on Xarelto, and heart failure.   She was referred to me by Dr. Duenas given her long-term use of amiodarone.  Last visit, she decreased dose to 100 mg daily and has been feeling well with no palpitations.  Her Amiodarone was discontinued last visit 6/2024. Her prior episode of VT was in the setting of an MI and does not require long-term antiarrhythmic use.  She is maintaining sinus on EKG today. She will return to the office in 6 months.  Mrs. Brewster appeared to understand the whole discussion and verbalized that all of her questions were answered to her satisfaction.  Thank you for allowing me to be involved in the care of this pleasant woman. Please feel free to contact me with any questions.

## 2024-10-07 ENCOUNTER — LABORATORY RESULT (OUTPATIENT)
Age: 79
End: 2024-10-07

## 2024-10-07 ENCOUNTER — APPOINTMENT (OUTPATIENT)
Dept: CARDIOLOGY | Facility: CLINIC | Age: 79
End: 2024-10-07
Payer: MEDICARE

## 2024-10-07 VITALS
SYSTOLIC BLOOD PRESSURE: 130 MMHG | TEMPERATURE: 98.1 F | HEIGHT: 63 IN | RESPIRATION RATE: 15 BRPM | DIASTOLIC BLOOD PRESSURE: 60 MMHG | HEART RATE: 66 BPM | OXYGEN SATURATION: 95 %

## 2024-10-07 DIAGNOSIS — R31.9 HEMATURIA, UNSPECIFIED: ICD-10-CM

## 2024-10-07 DIAGNOSIS — I25.10 ATHEROSCLEROTIC HEART DISEASE OF NATIVE CORONARY ARTERY W/OUT ANGINA PECTORIS: ICD-10-CM

## 2024-10-07 DIAGNOSIS — Z13.228 ENCOUNTER FOR SCREENING FOR OTHER METABOLIC DISORDERS: ICD-10-CM

## 2024-10-07 DIAGNOSIS — E11.9 TYPE 2 DIABETES MELLITUS W/OUT COMPLICATIONS: ICD-10-CM

## 2024-10-07 DIAGNOSIS — I50.9 HEART FAILURE, UNSPECIFIED: ICD-10-CM

## 2024-10-07 DIAGNOSIS — R07.81 PLEURODYNIA: ICD-10-CM

## 2024-10-07 DIAGNOSIS — Z91.81 HISTORY OF FALLING: ICD-10-CM

## 2024-10-07 DIAGNOSIS — I48.91 UNSPECIFIED ATRIAL FIBRILLATION: ICD-10-CM

## 2024-10-07 DIAGNOSIS — E03.9 HYPOTHYROIDISM, UNSPECIFIED: ICD-10-CM

## 2024-10-07 DIAGNOSIS — Z23 ENCOUNTER FOR IMMUNIZATION: ICD-10-CM

## 2024-10-07 DIAGNOSIS — D64.9 ANEMIA, UNSPECIFIED: ICD-10-CM

## 2024-10-07 DIAGNOSIS — E66.3 OVERWEIGHT: ICD-10-CM

## 2024-10-07 DIAGNOSIS — I47.29 OTHER VENTRICULAR TACHYCARDIA: ICD-10-CM

## 2024-10-07 DIAGNOSIS — I10 ESSENTIAL (PRIMARY) HYPERTENSION: ICD-10-CM

## 2024-10-07 PROCEDURE — 99214 OFFICE O/P EST MOD 30 MIN: CPT

## 2024-10-07 PROCEDURE — G2211 COMPLEX E/M VISIT ADD ON: CPT

## 2024-10-07 PROCEDURE — 90662 IIV NO PRSV INCREASED AG IM: CPT

## 2024-10-07 PROCEDURE — 93000 ELECTROCARDIOGRAM COMPLETE: CPT

## 2024-10-07 PROCEDURE — G0008: CPT

## 2024-10-07 RX ORDER — MIRABEGRON 25 MG/1
25 TABLET, EXTENDED RELEASE ORAL DAILY
Refills: 0 | Status: ACTIVE | COMMUNITY
Start: 2024-10-07

## 2024-10-11 LAB
25(OH)D3 SERPL-MCNC: 44.8 NG/ML
ANION GAP SERPL CALC-SCNC: 13 MMOL/L
BASOPHILS # BLD AUTO: 0.06 K/UL
BASOPHILS NFR BLD AUTO: 1 %
BUN SERPL-MCNC: 29 MG/DL
CALCIUM SERPL-MCNC: 8.9 MG/DL
CHLORIDE SERPL-SCNC: 107 MMOL/L
CHOLEST SERPL-MCNC: 140 MG/DL
CO2 SERPL-SCNC: 21 MMOL/L
CREAT SERPL-MCNC: 1.23 MG/DL
EGFR: 44 ML/MIN/1.73M2
EOSINOPHIL # BLD AUTO: 0.31 K/UL
EOSINOPHIL NFR BLD AUTO: 5.1 %
ESTIMATED AVERAGE GLUCOSE: 143 MG/DL
FERRITIN SERPL-MCNC: 76 NG/ML
FOLATE SERPL-MCNC: 13.1 NG/ML
GLUCOSE SERPL-MCNC: 114 MG/DL
HBA1C MFR BLD HPLC: 6.6 %
HCT VFR BLD CALC: 36.6 %
HDLC SERPL-MCNC: 45 MG/DL
HGB BLD-MCNC: 11.2 G/DL
IMM GRANULOCYTES NFR BLD AUTO: 0.2 %
IRON SATN MFR SERPL: 43 %
IRON SERPL-MCNC: 130 UG/DL
LDLC SERPL CALC-MCNC: 67 MG/DL
LYMPHOCYTES # BLD AUTO: 1.67 K/UL
LYMPHOCYTES NFR BLD AUTO: 27.5 %
MAN DIFF?: NORMAL
MCHC RBC-ENTMCNC: 30.6 GM/DL
MCHC RBC-ENTMCNC: 32.2 PG
MCV RBC AUTO: 105.2 FL
MONOCYTES # BLD AUTO: 0.63 K/UL
MONOCYTES NFR BLD AUTO: 10.4 %
NEUTROPHILS # BLD AUTO: 3.4 K/UL
NEUTROPHILS NFR BLD AUTO: 55.8 %
NONHDLC SERPL-MCNC: 95 MG/DL
NT-PROBNP SERPL-MCNC: 660 PG/ML
PLATELET # BLD AUTO: 208 K/UL
POTASSIUM SERPL-SCNC: 4.8 MMOL/L
RBC # BLD: 3.48 M/UL
RBC # FLD: 15.5 %
SODIUM SERPL-SCNC: 141 MMOL/L
T4 FREE SERPL-MCNC: 1.3 NG/DL
TIBC SERPL-MCNC: 300 UG/DL
TRANSFERRIN SERPL-MCNC: 244 MG/DL
TRIGL SERPL-MCNC: 168 MG/DL
TSH SERPL-ACNC: 3.52 UIU/ML
UIBC SERPL-MCNC: 170 UG/DL
VIT B12 SERPL-MCNC: 594 PG/ML
WBC # FLD AUTO: 6.08 K/UL

## 2024-10-18 ENCOUNTER — APPOINTMENT (OUTPATIENT)
Dept: UROLOGY | Facility: CLINIC | Age: 79
End: 2024-10-18
Payer: MEDICARE

## 2024-10-18 DIAGNOSIS — N39.0 URINARY TRACT INFECTION, SITE NOT SPECIFIED: ICD-10-CM

## 2024-10-18 PROCEDURE — G2211 COMPLEX E/M VISIT ADD ON: CPT

## 2024-10-18 PROCEDURE — 99214 OFFICE O/P EST MOD 30 MIN: CPT

## 2024-10-21 LAB
APPEARANCE: CLEAR
BACTERIA UR CULT: NORMAL
BACTERIA: NEGATIVE /HPF
BILIRUBIN URINE: NEGATIVE
BLOOD URINE: ABNORMAL
CAST: 0 /LPF
COLOR: YELLOW
EPITHELIAL CELLS: 2 /HPF
GLUCOSE QUALITATIVE U: >=1000 MG/DL
KETONES URINE: NEGATIVE MG/DL
LEUKOCYTE ESTERASE URINE: ABNORMAL
MICROSCOPIC-UA: NORMAL
NITRITE URINE: NEGATIVE
PH URINE: 5.5
PROTEIN URINE: 30 MG/DL
RED BLOOD CELLS URINE: 2 /HPF
REVIEW: NORMAL
SPECIFIC GRAVITY URINE: 1.02
UROBILINOGEN URINE: 0.2 MG/DL
WHITE BLOOD CELLS URINE: 4 /HPF

## 2025-01-22 ENCOUNTER — APPOINTMENT (OUTPATIENT)
Dept: UROLOGY | Facility: CLINIC | Age: 80
End: 2025-01-22

## 2025-01-29 ENCOUNTER — APPOINTMENT (OUTPATIENT)
Dept: UROLOGY | Facility: CLINIC | Age: 80
End: 2025-01-29

## 2025-02-12 ENCOUNTER — APPOINTMENT (OUTPATIENT)
Dept: UROLOGY | Facility: CLINIC | Age: 80
End: 2025-02-12
Payer: MEDICARE

## 2025-02-12 DIAGNOSIS — N32.81 OVERACTIVE BLADDER: ICD-10-CM

## 2025-02-12 PROCEDURE — 99213 OFFICE O/P EST LOW 20 MIN: CPT

## 2025-02-12 PROCEDURE — G2211 COMPLEX E/M VISIT ADD ON: CPT

## 2025-02-12 RX ORDER — MIRABEGRON 25 MG/1
25 TABLET, EXTENDED RELEASE ORAL
Qty: 1 | Refills: 3 | Status: ACTIVE | COMMUNITY
Start: 2025-02-12 | End: 1900-01-01

## 2025-02-18 ENCOUNTER — APPOINTMENT (OUTPATIENT)
Dept: CARDIOLOGY | Facility: CLINIC | Age: 80
End: 2025-02-18
Payer: MEDICARE

## 2025-02-18 VITALS
RESPIRATION RATE: 16 BRPM | DIASTOLIC BLOOD PRESSURE: 64 MMHG | WEIGHT: 183 LBS | HEIGHT: 63 IN | SYSTOLIC BLOOD PRESSURE: 110 MMHG | HEART RATE: 60 BPM | TEMPERATURE: 98.4 F | BODY MASS INDEX: 32.43 KG/M2 | OXYGEN SATURATION: 97 %

## 2025-02-18 DIAGNOSIS — D64.9 ANEMIA, UNSPECIFIED: ICD-10-CM

## 2025-02-18 DIAGNOSIS — I47.29 OTHER VENTRICULAR TACHYCARDIA: ICD-10-CM

## 2025-02-18 DIAGNOSIS — E11.9 TYPE 2 DIABETES MELLITUS W/OUT COMPLICATIONS: ICD-10-CM

## 2025-02-18 DIAGNOSIS — I10 ESSENTIAL (PRIMARY) HYPERTENSION: ICD-10-CM

## 2025-02-18 DIAGNOSIS — Z91.81 HISTORY OF FALLING: ICD-10-CM

## 2025-02-18 DIAGNOSIS — F41.9 ANXIETY DISORDER, UNSPECIFIED: ICD-10-CM

## 2025-02-18 DIAGNOSIS — H26.9 UNSPECIFIED CATARACT: ICD-10-CM

## 2025-02-18 DIAGNOSIS — E55.9 VITAMIN D DEFICIENCY, UNSPECIFIED: ICD-10-CM

## 2025-02-18 DIAGNOSIS — N28.9 DISORDER OF KIDNEY AND URETER, UNSPECIFIED: ICD-10-CM

## 2025-02-18 DIAGNOSIS — R06.00 DYSPNEA, UNSPECIFIED: ICD-10-CM

## 2025-02-18 DIAGNOSIS — E03.9 HYPOTHYROIDISM, UNSPECIFIED: ICD-10-CM

## 2025-02-18 DIAGNOSIS — E66.3 OVERWEIGHT: ICD-10-CM

## 2025-02-18 DIAGNOSIS — Z01.818 ENCOUNTER FOR OTHER PREPROCEDURAL EXAMINATION: ICD-10-CM

## 2025-02-18 DIAGNOSIS — N39.0 URINARY TRACT INFECTION, SITE NOT SPECIFIED: ICD-10-CM

## 2025-02-18 DIAGNOSIS — R31.9 HEMATURIA, UNSPECIFIED: ICD-10-CM

## 2025-02-18 DIAGNOSIS — I50.9 HEART FAILURE, UNSPECIFIED: ICD-10-CM

## 2025-02-18 DIAGNOSIS — I48.91 UNSPECIFIED ATRIAL FIBRILLATION: ICD-10-CM

## 2025-02-18 DIAGNOSIS — I25.10 ATHEROSCLEROTIC HEART DISEASE OF NATIVE CORONARY ARTERY W/OUT ANGINA PECTORIS: ICD-10-CM

## 2025-02-18 DIAGNOSIS — M25.50 PAIN IN UNSPECIFIED JOINT: ICD-10-CM

## 2025-02-18 DIAGNOSIS — Z13.228 ENCOUNTER FOR SCREENING FOR OTHER METABOLIC DISORDERS: ICD-10-CM

## 2025-02-18 PROCEDURE — G2211 COMPLEX E/M VISIT ADD ON: CPT

## 2025-02-18 PROCEDURE — 93000 ELECTROCARDIOGRAM COMPLETE: CPT

## 2025-02-18 PROCEDURE — 99214 OFFICE O/P EST MOD 30 MIN: CPT

## 2025-02-18 RX ORDER — SERTRALINE 25 MG/1
25 TABLET, FILM COATED ORAL
Qty: 30 | Refills: 1 | Status: ACTIVE | COMMUNITY
Start: 2025-02-18 | End: 1900-01-01

## 2025-02-19 ENCOUNTER — APPOINTMENT (OUTPATIENT)
Dept: CARDIOLOGY | Facility: CLINIC | Age: 80
End: 2025-02-19
Payer: MEDICARE

## 2025-02-19 PROCEDURE — 93306 TTE W/DOPPLER COMPLETE: CPT

## 2025-02-19 PROCEDURE — 78452 HT MUSCLE IMAGE SPECT MULT: CPT

## 2025-02-19 PROCEDURE — A9500: CPT

## 2025-02-19 PROCEDURE — 93015 CV STRESS TEST SUPVJ I&R: CPT

## 2025-02-19 RX ORDER — REGADENOSON 0.08 MG/ML
0.4 INJECTION, SOLUTION INTRAVENOUS
Qty: 1 | Refills: 0 | Status: COMPLETED | OUTPATIENT
Start: 2025-02-19

## 2025-02-19 RX ADMIN — REGADENOSON 4 MG/5ML: 0.08 INJECTION, SOLUTION INTRAVENOUS at 00:00

## 2025-02-20 ENCOUNTER — NON-APPOINTMENT (OUTPATIENT)
Age: 80
End: 2025-02-20

## 2025-02-20 LAB
24R-OH-CALCIDIOL SERPL-MCNC: 56.7 PG/ML
ALBUMIN SERPL ELPH-MCNC: 4.3 G/DL
ALP BLD-CCNC: 150 U/L
ALT SERPL-CCNC: 6 U/L
ANION GAP SERPL CALC-SCNC: 12 MMOL/L
APPEARANCE: CLEAR
AST SERPL-CCNC: 16 U/L
BACTERIA UR CULT: NORMAL
BACTERIA: NEGATIVE /HPF
BASOPHILS # BLD AUTO: 0.07 K/UL
BASOPHILS NFR BLD AUTO: 1 %
BILIRUB SERPL-MCNC: 0.4 MG/DL
BILIRUBIN URINE: NEGATIVE
BLOOD URINE: NEGATIVE
BUN SERPL-MCNC: 30 MG/DL
CALCIUM SERPL-MCNC: 9.5 MG/DL
CAST: 0 /LPF
CCP AB SER IA-ACNC: <8 U/ML
CHLORIDE SERPL-SCNC: 104 MMOL/L
CK SERPL-CCNC: 83 U/L
CO2 SERPL-SCNC: 24 MMOL/L
COLOR: YELLOW
CREAT SERPL-MCNC: 1.33 MG/DL
CRP SERPL-MCNC: <3 MG/L
DSDNA AB SER-ACNC: <1 IU/ML
EGFR: 40 ML/MIN/1.73M2
EOSINOPHIL # BLD AUTO: 0.37 K/UL
EOSINOPHIL NFR BLD AUTO: 5.1 %
EPITHELIAL CELLS: 5 /HPF
ERYTHROCYTE [SEDIMENTATION RATE] IN BLOOD BY WESTERGREN METHOD: 33 MM/HR
FERRITIN SERPL-MCNC: 94 NG/ML
FOLATE SERPL-MCNC: 17.7 NG/ML
GLUCOSE QUALITATIVE U: >=1000 MG/DL
GLUCOSE SERPL-MCNC: 106 MG/DL
HCT VFR BLD CALC: 40.3 %
HGB BLD-MCNC: 12.8 G/DL
IMM GRANULOCYTES NFR BLD AUTO: 0.3 %
IRON SATN MFR SERPL: 54 %
IRON SERPL-MCNC: 176 UG/DL
KETONES URINE: NEGATIVE MG/DL
LEUKOCYTE ESTERASE URINE: ABNORMAL
LYMPHOCYTES # BLD AUTO: 2.12 K/UL
LYMPHOCYTES NFR BLD AUTO: 29 %
MAGNESIUM SERPL-MCNC: 2.8 MG/DL
MAN DIFF?: NORMAL
MCHC RBC-ENTMCNC: 31.8 G/DL
MCHC RBC-ENTMCNC: 31.8 PG
MCV RBC AUTO: 100.2 FL
MICROSCOPIC-UA: NORMAL
MONOCYTES # BLD AUTO: 0.73 K/UL
MONOCYTES NFR BLD AUTO: 10 %
NEUTROPHILS # BLD AUTO: 4 K/UL
NEUTROPHILS NFR BLD AUTO: 54.6 %
NITRITE URINE: NEGATIVE
NT-PROBNP SERPL-MCNC: 367 PG/ML
PH URINE: 5.5
PHOSPHATE SERPL-MCNC: 5 MG/DL
PLATELET # BLD AUTO: 198 K/UL
POTASSIUM SERPL-SCNC: 5.6 MMOL/L
PROT SERPL-MCNC: 7 G/DL
PROTEIN URINE: NEGATIVE MG/DL
RBC # BLD: 4.02 M/UL
RBC # FLD: 14.5 %
RED BLOOD CELLS URINE: 0 /HPF
RF+CCP IGG SER-IMP: NEGATIVE
RHEUMATOID FACT SER QL: <10 IU/ML
SODIUM SERPL-SCNC: 140 MMOL/L
SPECIFIC GRAVITY URINE: 1.02
TIBC SERPL-MCNC: 324 UG/DL
TRANSFERRIN SERPL-MCNC: 266 MG/DL
UIBC SERPL-MCNC: 147 UG/DL
UROBILINOGEN URINE: 0.2 MG/DL
VIT B12 SERPL-MCNC: 700 PG/ML
WBC # FLD AUTO: 7.31 K/UL
WHITE BLOOD CELLS URINE: 16 /HPF

## 2025-02-21 ENCOUNTER — NON-APPOINTMENT (OUTPATIENT)
Age: 80
End: 2025-02-21

## 2025-02-21 ENCOUNTER — APPOINTMENT (OUTPATIENT)
Dept: PULMONOLOGY | Facility: CLINIC | Age: 80
End: 2025-02-21

## 2025-02-21 LAB
ANA PAT FLD IF-IMP: ABNORMAL
ANA SER IF-ACNC: ABNORMAL

## 2025-02-22 ENCOUNTER — TRANSCRIPTION ENCOUNTER (OUTPATIENT)
Age: 80
End: 2025-02-22

## 2025-02-27 LAB
ANION GAP SERPL CALC-SCNC: 13 MMOL/L
BUN SERPL-MCNC: 33 MG/DL
CALCIUM SERPL-MCNC: 8.8 MG/DL
CHLORIDE SERPL-SCNC: 103 MMOL/L
CO2 SERPL-SCNC: 19 MMOL/L
CREAT SERPL-MCNC: 1.33 MG/DL
EGFR: 40 ML/MIN/1.73M2
GLUCOSE SERPL-MCNC: 162 MG/DL
MAGNESIUM SERPL-MCNC: 2.5 MG/DL
PHOSPHATE SERPL-MCNC: 3.4 MG/DL
POTASSIUM SERPL-SCNC: 5.4 MMOL/L
SODIUM SERPL-SCNC: 135 MMOL/L

## 2025-03-03 LAB
ANION GAP SERPL CALC-SCNC: 18 MMOL/L
BUN SERPL-MCNC: 25 MG/DL
CALCIUM SERPL-MCNC: 9.1 MG/DL
CHLORIDE SERPL-SCNC: 102 MMOL/L
CO2 SERPL-SCNC: 16 MMOL/L
CREAT SERPL-MCNC: 1.29 MG/DL
EGFR: 42 ML/MIN/1.73M2
GLUCOSE SERPL-MCNC: 96 MG/DL
POTASSIUM SERPL-SCNC: 5.1 MMOL/L
POTASSIUM SERPL-SCNC: 5.8 MMOL/L
SODIUM SERPL-SCNC: 136 MMOL/L

## 2025-03-06 LAB
ANION GAP SERPL CALC-SCNC: 16 MMOL/L
BUN SERPL-MCNC: 27 MG/DL
CALCIUM SERPL-MCNC: 9.5 MG/DL
CHLORIDE SERPL-SCNC: 102 MMOL/L
CO2 SERPL-SCNC: 18 MMOL/L
CREAT SERPL-MCNC: 1.38 MG/DL
EGFRCR SERPLBLD CKD-EPI 2021: 38 ML/MIN/1.73M2
GLUCOSE SERPL-MCNC: 175 MG/DL
POTASSIUM SERPL-SCNC: 5.1 MMOL/L
POTASSIUM SERPL-SCNC: 5.2 MMOL/L
SODIUM SERPL-SCNC: 137 MMOL/L

## 2025-03-12 ENCOUNTER — RX RENEWAL (OUTPATIENT)
Age: 80
End: 2025-03-12

## 2025-03-26 ENCOUNTER — NON-APPOINTMENT (OUTPATIENT)
Age: 80
End: 2025-03-26

## 2025-03-26 ENCOUNTER — APPOINTMENT (OUTPATIENT)
Dept: ELECTROPHYSIOLOGY | Facility: CLINIC | Age: 80
End: 2025-03-26
Payer: MEDICARE

## 2025-03-26 VITALS
DIASTOLIC BLOOD PRESSURE: 70 MMHG | WEIGHT: 183 LBS | HEIGHT: 63 IN | SYSTOLIC BLOOD PRESSURE: 125 MMHG | OXYGEN SATURATION: 93 % | HEART RATE: 66 BPM | BODY MASS INDEX: 32.43 KG/M2

## 2025-03-26 DIAGNOSIS — I10 ESSENTIAL (PRIMARY) HYPERTENSION: ICD-10-CM

## 2025-03-26 DIAGNOSIS — I48.91 UNSPECIFIED ATRIAL FIBRILLATION: ICD-10-CM

## 2025-03-26 DIAGNOSIS — I47.29 OTHER VENTRICULAR TACHYCARDIA: ICD-10-CM

## 2025-03-26 PROCEDURE — 93000 ELECTROCARDIOGRAM COMPLETE: CPT

## 2025-03-26 PROCEDURE — 99214 OFFICE O/P EST MOD 30 MIN: CPT

## 2025-04-23 ENCOUNTER — RX RENEWAL (OUTPATIENT)
Age: 80
End: 2025-04-23

## 2025-06-19 ENCOUNTER — APPOINTMENT (OUTPATIENT)
Dept: CARDIOLOGY | Facility: CLINIC | Age: 80
End: 2025-06-19
Payer: MEDICARE

## 2025-06-19 VITALS
TEMPERATURE: 98.3 F | SYSTOLIC BLOOD PRESSURE: 118 MMHG | DIASTOLIC BLOOD PRESSURE: 74 MMHG | HEART RATE: 68 BPM | WEIGHT: 183 LBS | HEIGHT: 63 IN | BODY MASS INDEX: 32.43 KG/M2 | OXYGEN SATURATION: 96 %

## 2025-06-19 PROBLEM — I25.118 CORONARY ARTERY DISEASE WITH OTHER FORM OF ANGINA PECTORIS: Status: RESOLVED | Noted: 2025-06-19 | Resolved: 2025-06-19

## 2025-06-19 PROBLEM — R76.8 ANA POSITIVE: Status: ACTIVE | Noted: 2025-06-19

## 2025-06-19 PROCEDURE — 99214 OFFICE O/P EST MOD 30 MIN: CPT

## 2025-06-19 PROCEDURE — G2211 COMPLEX E/M VISIT ADD ON: CPT

## 2025-06-19 PROCEDURE — 93000 ELECTROCARDIOGRAM COMPLETE: CPT

## 2025-06-21 PROBLEM — R74.8 ELEVATED ALKALINE PHOSPHATASE LEVEL: Status: ACTIVE | Noted: 2025-06-21

## 2025-06-21 LAB
ALBUMIN SERPL ELPH-MCNC: 4.5 G/DL
ALP BLD-CCNC: 160 U/L
ALT SERPL-CCNC: 13 U/L
ANA TITR SER: ABNORMAL
ANA TITR SER: ABNORMAL
ANION GAP SERPL CALC-SCNC: 17 MMOL/L
APPEARANCE: CLEAR
AST SERPL-CCNC: 24 U/L
BACTERIA UR CULT: NORMAL
BACTERIA: NEGATIVE /HPF
BASOPHILS # BLD AUTO: 0.07 K/UL
BASOPHILS NFR BLD AUTO: 0.9 %
BILIRUB DIRECT SERPL-MCNC: 0.18 MG/DL
BILIRUB INDIRECT SERPL-MCNC: 0.3 MG/DL
BILIRUB SERPL-MCNC: 0.5 MG/DL
BILIRUBIN URINE: NEGATIVE
BLOOD URINE: NEGATIVE
BUN SERPL-MCNC: 20 MG/DL
CALCIUM SERPL-MCNC: 9.4 MG/DL
CAST: 0 /LPF
CCP AB SER IA-ACNC: <8 U/ML
CCP AB SER QL: NEGATIVE
CHLORIDE SERPL-SCNC: 103 MMOL/L
CHOLEST SERPL-MCNC: 154 MG/DL
CK SERPL-CCNC: 80 U/L
CO2 SERPL-SCNC: 20 MMOL/L
COLOR: YELLOW
CREAT SERPL-MCNC: 1.15 MG/DL
CRP SERPL-MCNC: <3 MG/L
DSDNA AB SER-ACNC: <1 IU/ML
EGFRCR SERPLBLD CKD-EPI 2021: 48 ML/MIN/1.73M2
EOSINOPHIL # BLD AUTO: 0.35 K/UL
EOSINOPHIL NFR BLD AUTO: 4.6 %
EPITHELIAL CELLS: 1 /HPF
ERYTHROCYTE [SEDIMENTATION RATE] IN BLOOD BY WESTERGREN METHOD: 43 MM/HR
ESTIMATED AVERAGE GLUCOSE: 148 MG/DL
FERRITIN SERPL-MCNC: 95 NG/ML
FOLATE SERPL-MCNC: 14.4 NG/ML
GLUCOSE QUALITATIVE U: >=1000 MG/DL
GLUCOSE SERPL-MCNC: 105 MG/DL
HBA1C MFR BLD HPLC: 6.8 %
HCT VFR BLD CALC: 39.4 %
HDLC SERPL-MCNC: 35 MG/DL
HGB BLD-MCNC: 12.6 G/DL
IMM GRANULOCYTES NFR BLD AUTO: 0.3 %
IRON SATN MFR SERPL: 31 %
IRON SERPL-MCNC: 103 UG/DL
KETONES URINE: NEGATIVE MG/DL
LDLC SERPL-MCNC: 80 MG/DL
LEUKOCYTE ESTERASE URINE: NEGATIVE
LYMPHOCYTES # BLD AUTO: 1.98 K/UL
LYMPHOCYTES NFR BLD AUTO: 25.7 %
MAGNESIUM SERPL-MCNC: 2.6 MG/DL
MAN DIFF?: NORMAL
MCHC RBC-ENTMCNC: 31.7 PG
MCHC RBC-ENTMCNC: 32 G/DL
MCV RBC AUTO: 99.2 FL
MICROSCOPIC-UA: NORMAL
MONOCYTES # BLD AUTO: 0.75 K/UL
MONOCYTES NFR BLD AUTO: 9.8 %
NEUTROPHILS # BLD AUTO: 4.52 K/UL
NEUTROPHILS NFR BLD AUTO: 58.7 %
NITRITE URINE: NEGATIVE
NONHDLC SERPL-MCNC: 119 MG/DL
NT-PROBNP SERPL-MCNC: 796 PG/ML
PH URINE: 5.5
PLATELET # BLD AUTO: 220 K/UL
POTASSIUM SERPL-SCNC: 4.8 MMOL/L
PROT SERPL-MCNC: 7.5 G/DL
PROTEIN URINE: NEGATIVE MG/DL
RBC # BLD: 3.97 M/UL
RBC # FLD: 13.2 %
RED BLOOD CELLS URINE: 0 /HPF
RHEUMATOID FACT SERPL-ACNC: <10 IU/ML
SODIUM SERPL-SCNC: 140 MMOL/L
SPECIFIC GRAVITY URINE: 1.02
T3FREE SERPL-MCNC: 2.52 PG/ML
T4 FREE SERPL-MCNC: 1.2 NG/DL
TIBC SERPL-MCNC: 333 UG/DL
TRIGL SERPL-MCNC: 232 MG/DL
TSH SERPL-ACNC: 0.98 UIU/ML
UIBC SERPL-MCNC: 230 UG/DL
UROBILINOGEN URINE: 0.2 MG/DL
VIT B12 SERPL-MCNC: 550 PG/ML
WBC # FLD AUTO: 7.69 K/UL
WHITE BLOOD CELLS URINE: 0 /HPF

## 2025-06-24 LAB
ALBUMIN SERPL ELPH-MCNC: 4.2 G/DL
ALP BLD-CCNC: 155 U/L
ALT SERPL-CCNC: 11 U/L
AST SERPL-CCNC: 20 U/L
BILIRUB DIRECT SERPL-MCNC: 0.13 MG/DL
BILIRUB INDIRECT SERPL-MCNC: 0.2 MG/DL
BILIRUB SERPL-MCNC: 0.4 MG/DL
GGT SERPL-CCNC: 9 U/L
PROT SERPL-MCNC: 6.7 G/DL

## 2025-06-25 LAB — MITOCHONDRIA AB SER IF-ACNC: NORMAL

## 2025-06-26 ENCOUNTER — APPOINTMENT (OUTPATIENT)
Dept: ULTRASOUND IMAGING | Facility: CLINIC | Age: 80
End: 2025-06-26

## 2025-06-26 PROCEDURE — 76700 US EXAM ABDOM COMPLETE: CPT | Mod: 26

## 2025-06-27 LAB — 5NT SERPL-CCNC: 3 IU/L

## 2025-06-28 LAB
ALP BLD-CCNC: 146 IU/L
ALP BONE CFR SERPL: 65 %
ALP INTEST CFR SERPL: 2 %
ALP LIVER CFR SERPL: 33 %

## 2025-07-29 ENCOUNTER — RX RENEWAL (OUTPATIENT)
Age: 80
End: 2025-07-29

## 2025-07-29 RX ORDER — ASPIRIN 81 MG/1
81 TABLET, COATED ORAL
Qty: 45 | Refills: 1 | Status: ACTIVE | COMMUNITY
Start: 2025-07-29 | End: 1900-01-01

## 2025-08-04 ENCOUNTER — APPOINTMENT (OUTPATIENT)
Dept: GASTROENTEROLOGY | Facility: CLINIC | Age: 80
End: 2025-08-04

## 2025-09-11 ENCOUNTER — RX RENEWAL (OUTPATIENT)
Age: 80
End: 2025-09-11